# Patient Record
Sex: FEMALE | Race: WHITE | Employment: FULL TIME | URBAN - METROPOLITAN AREA
[De-identification: names, ages, dates, MRNs, and addresses within clinical notes are randomized per-mention and may not be internally consistent; named-entity substitution may affect disease eponyms.]

---

## 2017-02-16 ENCOUNTER — OFFICE VISIT (OUTPATIENT)
Dept: INTERNAL MEDICINE CLINIC | Age: 47
End: 2017-02-16

## 2017-02-16 VITALS
TEMPERATURE: 98.1 F | BODY MASS INDEX: 25.66 KG/M2 | OXYGEN SATURATION: 97 % | HEART RATE: 86 BPM | DIASTOLIC BLOOD PRESSURE: 88 MMHG | WEIGHT: 144.8 LBS | SYSTOLIC BLOOD PRESSURE: 138 MMHG | HEIGHT: 63 IN

## 2017-02-16 DIAGNOSIS — D50.0 IRON DEFICIENCY ANEMIA DUE TO CHRONIC BLOOD LOSS: ICD-10-CM

## 2017-02-16 DIAGNOSIS — N92.1 MENORRHAGIA WITH IRREGULAR CYCLE: ICD-10-CM

## 2017-02-16 DIAGNOSIS — E03.9 HYPOTHYROIDISM, UNSPECIFIED TYPE: Primary | ICD-10-CM

## 2017-02-16 RX ORDER — LIOTHYRONINE SODIUM 25 UG/1
25 TABLET ORAL DAILY
Qty: 90 TAB | Refills: 3 | Status: SHIPPED | OUTPATIENT
Start: 2017-02-16 | End: 2017-12-28

## 2017-02-16 RX ORDER — LEVOTHYROXINE SODIUM 150 MCG
TABLET ORAL
Qty: 90 TAB | Refills: 3 | Status: SHIPPED | OUTPATIENT
Start: 2017-02-16 | End: 2017-04-18 | Stop reason: SDUPTHER

## 2017-02-16 NOTE — MR AVS SNAPSHOT
Visit Information Date & Time Provider Department Dept. Phone Encounter #  
 2/16/2017 10:00 AM Galileo Fang, 2000 [facility] [phone] [id number] Follow-up Instructions Return in about 2 months (around 4/16/2017) for f/u thyroid , anemia , menses. Upcoming Health Maintenance Date Due Pneumococcal 19-64 Medium Risk (1 of 1 - PPSV23) 12/5/1987 DTaP/Tdap/Td series (1 - Tdap) 12/5/1989 PAP AKA CERVICAL CYTOLOGY 12/5/1989 INFLUENZA AGE 9 TO ADULT 8/1/2016 Allergies as of 2/16/2017  Review Complete On: 2/16/2017 By: Galileo Fang MD  
 No Known Allergies Current Immunizations  Never Reviewed No immunizations on file. Not reviewed this visit You Were Diagnosed With   
  
 Codes Comments Hypothyroidism, unspecified type    -  Primary ICD-10-CM: E03.9 ICD-9-CM: 244.9 Iron deficiency anemia due to chronic blood loss     ICD-10-CM: D50.0 ICD-9-CM: 280.0 Menorrhagia with irregular cycle     ICD-10-CM: N92.1 ICD-9-CM: 626.2 Vitals BP Pulse Temp Height(growth percentile) Weight(growth percentile) SpO2  
 138/88 (BP 1 Location: Left arm, BP Patient Position: Sitting) 86 98.1 °F (36.7 °C) (Oral) 5' 3\" (1.6 m) 144 lb 12.8 oz (65.7 kg) 97% BMI OB Status Smoking Status 25.65 kg/m2 Having regular periods Current Every Day Smoker BMI and BSA Data Body Mass Index Body Surface Area  
 25.65 kg/m 2 1.71 m 2 Preferred Pharmacy Pharmacy Name Phone [address] [place] [address], [address] [place] [address] Suzanna Tidwell Said [phone] Your Updated Medication List  
  
   
This list is accurate as of: 2/16/17 11:00 AM.  Always use your most recent med list.  
  
  
  
  
 * levothyroxine 150 mcg tablet Commonly known as:  SYNTHROID Take 1 Tab by mouth Daily (before breakfast). * SYNTHROID 150 mcg tablet Generic drug:  levothyroxine TAKE 1 TABLET BY MOUTH DAILY BEFORE BREAKFAST * LEVOTHYROXINE PO Take 150 mcg by mouth daily. liothyronine 25 mcg tablet Commonly known as:  CYTOMEL Take 1 Tab by mouth daily. PARoxetine 20 mg tablet Commonly known as:  PAXIL Take 1 Tab by mouth daily. * Notice: This list has 3 medication(s) that are the same as other medications prescribed for you. Read the directions carefully, and ask your doctor or other care provider to review them with you. Prescriptions Sent to Pharmacy Refills SYNTHROID 150 mcg tablet 3 Sig: TAKE 1 TABLET BY MOUTH DAILY BEFORE BREAKFAST Class: Normal  
 Pharmacy: [facility] [address] at [address] Ph #: [phone]  
 liothyronine (CYTOMEL) 25 mcg tablet 3 Sig: Take 1 Tab by mouth daily. Class: Normal  
 Pharmacy: [facility] [address] at [address] Ph #: [phone] Route: Oral  
  
We Performed the Following CBC W/O DIFF [83662 CPT(R)] REFERRAL TO OBSTETRICS AND GYNECOLOGY [REF51 Custom] Comments:  
 Please evaluate patient for menstrual irregularity and heavy bleeding T4, FREE [id number] CPT(R)] TSH 3RD GENERATION [69972 CPT(R)] Follow-up Instructions Return in about 2 months (around 4/16/2017) for f/u thyroid , anemia , menses. Referral Information Referral ID Referred By Referred To  
  
 [id number] MALLORIE, [address], MD   
   220 [facility] [address] [place], 310 [facility] Phone: [phone] Fax: [phone] Visits Status Start Date End Date 1 New Request 2/16/17 2/16/18 If your referral has a status of pending review or denied, additional information will be sent to support the outcome of this decision. Introducing [facility]!    
 Curry Baig introduces [employer] patient portal. Now you can access parts of your medical record, email your doctor's office, and request medication refills online. 1. In your internet browser, go to [email or web address] 2. Click on the First Time User? Click Here link in the Sign In box. You will see the New Member Sign Up page. 3. Enter your PowerInbox Access Code exactly as it appears below. You will not need to use this code after youve completed the sign-up process. If you do not sign up before the expiration date, you must request a new code. · PowerInbox Access Code: [email or web address] Expires: 5/17/2017 10:55 AM 
 
4. Enter the last four digits of your Social Security Number (xxxx) and Date of Birth (mm/dd/yyyy) as indicated and click Submit. You will be taken to the next sign-up page. 5. Create a PowerInbox ID. This will be your PowerInbox login ID and cannot be changed, so think of one that is secure and easy to remember. 6. Create a PowerInbox password. You can change your password at any time. 7. Enter your Password Reset Question and Answer. This can be used at a later time if you forget your password. 8. Enter your e-mail address. You will receive e-mail notification when new information is available in [address] 9. Click Sign Up. You can now view and download portions of your medical record. 10. Click the Download Summary menu link to download a portable copy of your medical information. If you have questions, please visit the Frequently Asked Questions section of the PowerInbox website. Remember, PowerInbox is NOT to be used for urgent needs. For medical emergencies, dial 911. Now available from your iPhone and Android! Please provide this summary of care documentation to your next provider. Your primary care clinician is listed as Douglas NOBLES. If you have any questions after today's visit, please call [phone].

## 2017-02-16 NOTE — PROGRESS NOTES
HISTORY OF Cj Gomes is a 48 y.o. female. HPI   Here to reestablish care. Moved from  [place], [place] Dr Dani Adler  Hx hypothyroidism --takes synthorid and cytomel. Former pt of Dr Porter Leos  Has been out of thyroid med x 3 months, feels cold, fatigeud, constipation and diarrhea    Required blood transfusion about 1 yr ago for anemia  Had colonoscopy-no polyps per pt  Has heavy menses with clots 2 times per month x 1-2 years  No gyn evaluation per pt      Patient Active Problem List    Diagnosis Date Noted    Iron deficiency anemia due to chronic blood loss 02/16/2017    Unspecified hypothyroidism 02/11/2013    Smoker 02/11/2013    Depression 02/11/2013    Anxiety 02/11/2013    AK (actinic keratosis) 02/11/2013     Current Outpatient Prescriptions   Medication Sig Dispense Refill    SYNTHROID 150 mcg tablet TAKE 1 TABLET BY MOUTH DAILY BEFORE BREAKFAST 90 Tab 3    liothyronine (CYTOMEL) 25 mcg tablet Take 1 Tab by mouth daily. 90 Tab 3    levothyroxine (SYNTHROID) 150 mcg tablet Take 1 Tab by mouth Daily (before breakfast). 30 Tab 6    PARoxetine (PAXIL) 20 mg tablet Take 1 Tab by mouth daily. 90 Tab 1    LEVOTHYROXINE SODIUM (LEVOTHYROXINE PO) Take 150 mcg by mouth daily. No Known Allergies  Past Medical History   Diagnosis Date    Endocrine disease     Other ill-defined conditions(799.89)      graves     No past surgical history on file.   Family History   Problem Relation Age of Onset    Cancer Mother      thyroid cancer and depression    Psychiatric Disorder Mother     Diabetes Brother     Heart Disease Brother      Social History   Substance Use Topics    Smoking status: Current Every Day Smoker     Packs/day: 1.00     Years: 30.00    Smokeless tobacco: Never Used    Alcohol use Yes      Comment: 10      Lab Results  Component Value Date/Time   WBC 8.8 08/04/2012 11:45 PM   HGB 9.3 08/04/2012 11:45 PM   HCT 30.4 08/04/2012 11:45 PM   PLATELET 338 08/04/2012 11:45 PM   MCV 77.4 08/04/2012 11:45 PM       Lab Results  Component Value Date/Time   GFR est AA >60 08/04/2012 11:45 PM   GFR est non-AA >60 08/04/2012 11:45 PM   Creatinine 0.7 08/04/2012 11:45 PM   BUN 5 08/04/2012 11:45 PM   Sodium 146 08/04/2012 11:45 PM   Potassium 3.9 08/04/2012 11:45 PM   Chloride 111 08/04/2012 11:45 PM   CO2 29 08/04/2012 11:45 PM      No results found for: TSH, TSH2, TSH3, TSHP, TSHELE, TSHEXT, TT3, T3U, T3UP, FRT3, FT3, FT4, FT4P, T4, T4P, FT4T, TT7, TSHEXT      Review of Systems   Constitutional: Positive for malaise/fatigue. Negative for chills, fever and weight loss. HENT: Negative for congestion, ear discharge, hearing loss, nosebleeds, sore throat and tinnitus. Eyes: Negative for blurred vision, double vision, photophobia, pain, discharge and redness. Respiratory: Positive for cough and shortness of breath. Negative for hemoptysis, sputum production, wheezing and stridor. Cardiovascular: Positive for palpitations. Negative for chest pain, orthopnea, claudication, leg swelling and PND. Gastrointestinal: Negative for abdominal pain, blood in stool, constipation, diarrhea, heartburn, melena and nausea. Genitourinary: Negative for dysuria. Skin: Negative for rash. Physical Exam   Constitutional: She is oriented to person, place, and time. She appears well-developed and well-nourished. Appears stated age   HENT:   Head: Normocephalic. Mouth/Throat: Oropharynx is clear and moist.   Eyes: Pupils are equal, round, and reactive to light. Left eye exhibits no discharge. Neck: Normal range of motion. Neck supple. No JVD present. No tracheal deviation present. No thyromegaly present. Cardiovascular: Normal rate, regular rhythm, normal heart sounds and intact distal pulses. Exam reveals no gallop and no friction rub. No murmur heard. Pulmonary/Chest: Effort normal and breath sounds normal. No respiratory distress. She has no wheezes.  She has no rales. She exhibits no tenderness. Abdominal: Soft. Bowel sounds are normal. She exhibits no distension and no mass. There is no tenderness. There is no rebound and no guarding. Musculoskeletal: She exhibits no edema. Lymphadenopathy:     She has no cervical adenopathy. Neurological: She is alert and oriented to person, place, and time. No cranial nerve deficit. Coordination normal.   Skin: Skin is warm. tattoos on arms/wrists b/l   Psychiatric: She has a normal mood and affect. Her behavior is normal. Judgment and thought content normal.   Nursing note and vitals reviewed. ASSESSMENT and [address] was seen today for establish care. Diagnoses and all orders for this visit:    Hypothyroidism, unspecified type  -     TSH 3RD GENERATION  -     T4, FREE   Refill and restart cytomel and synthroid--BRAND   Plan on repeat labs in 8 weeks    Iron deficiency anemia due to chronic blood loss  -     CBC W/O DIFF  -     REFERRAL TO OBSTETRICS AND GYNECOLOGY--   May need oral iron and or transfusion    S/p colonoscopy< 2 years ago per pt   Symptomatic from low thyroid and anemia      Menorrhagia with irregular cycle  -     REFERRAL TO OBSTETRICS AND GYNECOLOGY  -     REFERRAL TO OBSTETRICS AND GYNECOLOGY    Other orders  -     SYNTHROID 150 mcg tablet; TAKE 1 TABLET BY MOUTH DAILY BEFORE BREAKFAST  -     liothyronine (CYTOMEL) 25 mcg tablet; Take 1 Tab by mouth daily. Sign release of records  Pt brought labs from 2014 reviewed and scanned  Follow-up Disposition:  Return in about 2 months (around 4/16/2017) for f/u thyroid , anemia , menses.

## 2017-02-17 LAB
ERYTHROCYTE [DISTWIDTH] IN BLOOD BY AUTOMATED COUNT: 19.3 % (ref 12.3–15.4)
HCT VFR BLD AUTO: 28.5 % (ref 34–46.6)
HGB BLD-MCNC: 7.9 G/DL (ref 11.1–15.9)
MCH RBC QN AUTO: 18.6 PG (ref 26.6–33)
MCHC RBC AUTO-ENTMCNC: 27.7 G/DL (ref 31.5–35.7)
MCV RBC AUTO: 67 FL (ref 79–97)
PLATELET # BLD AUTO: 377 X10E3/UL (ref 150–379)
RBC # BLD AUTO: 4.25 X10E6/UL (ref 3.77–5.28)
T4 FREE SERPL-MCNC: <0.11 NG/DL (ref 0.82–1.77)
TSH SERPL DL<=0.005 MIU/L-ACNC: 190.5 UIU/ML (ref 0.45–4.5)
WBC # BLD AUTO: 8.9 X10E3/UL (ref 3.4–10.8)

## 2017-02-22 NOTE — PROGRESS NOTES
Discussed labs. Has restarted levothyroxine. Advised to start otc iron 325 mg tid and needs to see gyn MD in next 2-3 weeks for significant anemia.  . Pt verbalized understanding and agreement

## 2017-04-18 ENCOUNTER — OFFICE VISIT (OUTPATIENT)
Dept: INTERNAL MEDICINE CLINIC | Age: 47
End: 2017-04-18

## 2017-04-18 VITALS
WEIGHT: 135 LBS | BODY MASS INDEX: 23.91 KG/M2 | DIASTOLIC BLOOD PRESSURE: 78 MMHG | TEMPERATURE: 97.7 F | HEART RATE: 89 BPM | SYSTOLIC BLOOD PRESSURE: 130 MMHG | OXYGEN SATURATION: 100 %

## 2017-04-18 DIAGNOSIS — F32.A DEPRESSION, UNSPECIFIED DEPRESSION TYPE: ICD-10-CM

## 2017-04-18 DIAGNOSIS — D50.9 IRON DEFICIENCY ANEMIA, UNSPECIFIED IRON DEFICIENCY ANEMIA TYPE: ICD-10-CM

## 2017-04-18 DIAGNOSIS — E03.9 UNSPECIFIED HYPOTHYROIDISM: Primary | ICD-10-CM

## 2017-04-18 RX ORDER — ESCITALOPRAM OXALATE 10 MG/1
10 TABLET ORAL DAILY
Qty: 30 TAB | Refills: 3 | Status: SHIPPED | OUTPATIENT
Start: 2017-04-18 | End: 2017-06-20 | Stop reason: SDUPTHER

## 2017-04-18 NOTE — PROGRESS NOTES
HISTORY OF Cj Gomes is a 48 y.o. female. HPI   F/u anemia/menorrhagia, hypothyroidism  Feeling depressed for months daily, took paxil in [place] last year--didn't feel well on it the 2nd time  Motivation is lacking, bad thoughts and mood. Has 7-9 days of heavy menses per month. Did not see gyn MD yet and has not seen gyn MD while in [place] previously  Weight is down several lbs since restarting levothyroxine but still feels cold  Not much appetite  Smokes 1/2 ppd  etoh 3-4 drinks per day. Patient Active Problem List    Diagnosis Date Noted    Iron deficiency anemia due to chronic blood loss 02/16/2017    Unspecified hypothyroidism 02/11/2013    Smoker 02/11/2013    Depression 02/11/2013    Anxiety 02/11/2013    AK (actinic keratosis) 02/11/2013     Current Outpatient Prescriptions   Medication Sig Dispense Refill    escitalopram oxalate (LEXAPRO) 10 mg tablet Take 1 Tab by mouth daily. 30 Tab 3    levothyroxine (SYNTHROID) 150 mcg tablet Take 1 Tab by mouth Daily (before breakfast). 30 Tab 6    liothyronine (CYTOMEL) 25 mcg tablet Take 1 Tab by mouth daily. 90 Tab 3     No Known Allergies   Lab Results  Component Value Date/Time   Glucose 93 08/04/2012 11:45 PM   Creatinine 0.7 08/04/2012 11:45 PM      No results found for: CHOL, CHOLX, CHLST, CHOLV, HDL, LDL, DLDL, LDLC, DLDLP, TGL, TGLX, TRIGL, [id number], TRIGP, CHHD, CHHDX    Lab Results  Component Value Date/Time   GFR est AA >60 08/04/2012 11:45 PM   GFR est non-AA >60 08/04/2012 11:45 PM   Creatinine 0.7 08/04/2012 11:45 PM   BUN 5 08/04/2012 11:45 PM   Sodium 146 08/04/2012 11:45 PM   Potassium 3.9 08/04/2012 11:45 PM   Chloride 111 08/04/2012 11:45 PM   CO2 29 08/04/2012 11:45 PM         ROS    Physical Exam   Constitutional: She appears well-developed and well-nourished. Appears stated age   Cardiovascular: Normal rate, regular rhythm and normal heart sounds. Exam reveals no gallop and no friction rub.     No murmur heard. Pulmonary/Chest: Effort normal and breath sounds normal. No respiratory distress. She has no wheezes. Abdominal: Soft. Bowel sounds are normal.   Musculoskeletal: She exhibits no edema. Neurological: She is alert. Psychiatric: She has a normal mood and affect. Nursing note and vitals reviewed. ASSESSMENT and [address] was seen today for thyroid problem, anemia and irregular menses. Diagnoses and all orders for this visit:    Unspecified hypothyroidism  -     TSH 3RD GENERATION  -     T4, FREE   Pt reports med adherence    Iron deficiency anemia, unspecified iron deficiency anemia type  -     CBC W/O DIFF  -     IRON PROFILE  -     FERRITIN  -     REFERRAL TO OBSTETRICS AND GYNECOLOGY-staff with assist with appt   Unable to tolerate oral iron d/t gi sxs per pt    Depression, unspecified depression type   Start lexapro 10 mg qd  Other orders  -     escitalopram oxalate (LEXAPRO) 10 mg tablet; Take 1 Tab by mouth daily. Follow-up Disposition:  Return in about 2 months (around 6/18/2017) for depression anemia.

## 2017-04-18 NOTE — MR AVS SNAPSHOT
Visit Information Date & Time Provider Department Dept. Phone Encounter #  
 4/18/2017 11:15 AM Brittany Ramirez, [address] [facility] [address] [phone] [id number] Follow-up Instructions Return in about 2 months (around 6/18/2017) for depression anemia. Upcoming Health Maintenance Date Due Pneumococcal 19-64 Medium Risk (1 of 1 - PPSV23) 12/5/1987 DTaP/Tdap/Td series (1 - Tdap) 12/5/1989 PAP AKA CERVICAL CYTOLOGY 12/5/1989 INFLUENZA AGE 9 TO ADULT 8/1/2016 Allergies as of 4/18/2017  Review Complete On: 4/18/2017 By: Brittany Ramirez MD  
 No Known Allergies Current Immunizations  Never Reviewed No immunizations on file. Not reviewed this visit You Were Diagnosed With   
  
 Codes Comments Unspecified hypothyroidism    -  Primary ICD-10-CM: E03.9 ICD-9-CM: 244.9 Iron deficiency anemia, unspecified iron deficiency anemia type     ICD-10-CM: D50.9 ICD-9-CM: 280.9 Depression, unspecified depression type     ICD-10-CM: F32.9 ICD-9-CM: 897 Vitals BP Pulse Temp Weight(growth percentile) LMP SpO2  
 130/78 (BP 1 Location: Left arm, BP Patient Position: Sitting) 89 97.7 °F (36.5 °C) (Oral) 135 lb (61.2 kg) 04/04/2017 100% BMI OB Status Smoking Status 23.91 kg/m2 Unknown Current Every Day Smoker Vitals History BMI and BSA Data Body Mass Index Body Surface Area  
 23.91 kg/m 2 1.65 m 2 Preferred Pharmacy Pharmacy Name Phone [id number] [address] [phone] Your Updated Medication List  
  
   
This list is accurate as of: 4/18/17 11:40 AM.  Always use your most recent med list.  
  
  
  
  
 escitalopram oxalate 10 mg tablet Commonly known as:  Aleatha Bernheim Take 1 Tab by mouth daily. levothyroxine 150 mcg tablet Commonly known as:  SYNTHROID Take 1 Tab by mouth Daily (before breakfast). liothyronine 25 mcg tablet Commonly known as:  CYTOMEL Take 1 Tab by mouth daily. Prescriptions Sent to Pharmacy Refills  
 escitalopram oxalate (LEXAPRO) 10 mg tablet 3 Sig: Take 1 Tab by mouth daily. Class: Normal  
 Pharmacy: [id number]- [address]  #: [phone] Route: Oral  
  
We Performed the Following CBC W/O DIFF [84126 CPT(R)] FERRITIN [98398 CPT(R)] IRON PROFILE [id number] CPT(R)] REFERRAL TO OBSTETRICS AND GYNECOLOGY [REF51 Custom] Comments:  
 Please evaluate patient for anemia, menorrhaiga T4, FREE [id number] CPT(R)] TSH 3RD GENERATION [67199 CPT(R)] Follow-up Instructions Return in about 2 months (around 6/18/2017) for depression anemia. Referral Information Referral ID Referred By Referred To  
  
 [id number] MALLORIE, [address] [place], [address] Visits Status Start Date End Date 1 New Request 4/18/17 4/18/18 If your referral has a status of pending review or denied, additional information will be sent to support the outcome of this decision. Introducing [facility]! Ce Pedroza introduces [employer] patient portal. Now you can access parts of your medical record, email your doctor's office, and request medication refills online. 1. In your internet browser, go to [email or web address] Who is [email or web address] 2. Click on the First Time User? Click Here link in the Sign In box. You will see the New Member Sign Up page. 3. Enter your [employer] Access Code exactly as it appears below. You will not need to use this code after youve completed the sign-up process. If you do not sign up before the expiration date, you must request a new code. · [employer] Access Code: [email or web address] Expires: 5/17/2017 11:55 AM 
 
 4. Enter the last four digits of your Social Security Number (xxxx) and Date of Birth (mm/dd/yyyy) as indicated and click Submit. You will be taken to the next sign-up page. 5. Create a Medify ID. This will be your Medify login ID and cannot be changed, so think of one that is secure and easy to remember. 6. Create a Medify password. You can change your password at any time. 7. Enter your Password Reset Question and Answer. This can be used at a later time if you forget your password. 8. Enter your e-mail address. You will receive e-mail notification when new information is available in [address] 9. Click Sign Up. You can now view and download portions of your medical record. 10. Click the Download Summary menu link to download a portable copy of your medical information. If you have questions, please visit the Frequently Asked Questions section of the Medify website. Remember, Medify is NOT to be used for urgent needs. For medical emergencies, dial 911. Now available from your iPhone and Android! Please provide this summary of care documentation to your next provider. Your primary care clinician is listed as Luzma NOBLES. If you have any questions after today's visit, please call [phone].

## 2017-04-19 LAB
ERYTHROCYTE [DISTWIDTH] IN BLOOD BY AUTOMATED COUNT: 18.8 % (ref 12.3–15.4)
FERRITIN SERPL-MCNC: 4 NG/ML (ref 15–150)
HCT VFR BLD AUTO: 25.3 % (ref 34–46.6)
HGB BLD-MCNC: 6.9 G/DL (ref 11.1–15.9)
IRON SATN MFR SERPL: 3 % (ref 15–55)
IRON SERPL-MCNC: 12 UG/DL (ref 27–159)
MCH RBC QN AUTO: 18 PG (ref 26.6–33)
MCHC RBC AUTO-ENTMCNC: 27.3 G/DL (ref 31.5–35.7)
MCV RBC AUTO: 66 FL (ref 79–97)
PLATELET # BLD AUTO: 313 X10E3/UL (ref 150–379)
RBC # BLD AUTO: 3.84 X10E6/UL (ref 3.77–5.28)
T4 FREE SERPL-MCNC: 1.06 NG/DL (ref 0.82–1.77)
TIBC SERPL-MCNC: 466 UG/DL (ref 250–450)
TSH SERPL DL<=0.005 MIU/L-ACNC: 0.04 UIU/ML (ref 0.45–4.5)
UIBC SERPL-MCNC: 454 UG/DL (ref 131–425)
WBC # BLD AUTO: 8.2 X10E3/UL (ref 3.4–10.8)

## 2017-04-21 RX ORDER — LEVOTHYROXINE SODIUM 125 UG/1
125 TABLET ORAL
Qty: 30 TAB | Refills: 6 | Status: SHIPPED | OUTPATIENT
Start: 2017-04-21 | End: 2017-05-26

## 2017-04-21 NOTE — PROGRESS NOTES
Discussed labs with pt--she is tired but not dizzy and does not want to go to the hospital.  We will arrange for outp blood transfusion for next week when she can get off work .   She will see gynMD next week--Dr Sergey Calhoun  Lower dose of levothyroxine in 125 mcg qd

## 2017-04-24 ENCOUNTER — DOCUMENTATION ONLY (OUTPATIENT)
Dept: INTERNAL MEDICINE CLINIC | Age: 47
End: 2017-04-24

## 2017-04-24 ENCOUNTER — TELEPHONE (OUTPATIENT)
Dept: INTERNAL MEDICINE CLINIC | Age: 47
End: 2017-04-24

## 2017-04-24 NOTE — TELEPHONE ENCOUNTER
Documentation of Dr. Mariama Moura speaking with patient regarding labs. No other call made to patient.

## 2017-04-24 NOTE — PROGRESS NOTES
Called pt --she has not heard from the infusion center. She can be contacted on her cell phone tomorrow. Area code is wring in chart.   Her number is [phone]

## 2017-04-24 NOTE — TELEPHONE ENCOUNTER
Patient is returning a call. Her number is [phone].     Message received & copied from [facility] after closing on 4/21/17

## 2017-04-25 ENCOUNTER — TELEPHONE (OUTPATIENT)
Dept: INTERNAL MEDICINE CLINIC | Age: 47
End: 2017-04-25

## 2017-04-25 ENCOUNTER — HOSPITAL ENCOUNTER (OUTPATIENT)
Dept: INFUSION THERAPY | Age: 47
Discharge: HOME | End: 2017-04-25
Payer: COMMERCIAL

## 2017-04-25 VITALS
TEMPERATURE: 99.1 F | DIASTOLIC BLOOD PRESSURE: 82 MMHG | HEART RATE: 80 BPM | RESPIRATION RATE: 16 BRPM | SYSTOLIC BLOOD PRESSURE: 145 MMHG

## 2017-04-25 PROCEDURE — 36415 COLL VENOUS BLD VENIPUNCTURE: CPT | Performed by: INTERNAL MEDICINE

## 2017-04-25 PROCEDURE — 86920 COMPATIBILITY TEST SPIN: CPT | Performed by: INTERNAL MEDICINE

## 2017-04-25 PROCEDURE — 86900 BLOOD TYPING SEROLOGIC ABO: CPT | Performed by: INTERNAL MEDICINE

## 2017-04-25 NOTE — TELEPHONE ENCOUNTER
Pt states that she is needing a call back in regards to getting a consent form for her infusion that she is having on 4/27/17. Please call pt.

## 2017-04-25 NOTE — PROGRESS NOTES
I spoke with patient and attempted to schedule her appt for transfusion. The Infusion Center needs patient to come in for a type & cross before this appt can be scheduled.  Patient provided with their phone number to call & set this up first.

## 2017-04-25 NOTE — PROGRESS NOTES
[facility] ([place], [place]) Lab Draw Note:  Arrived - 5419    Visit Vitals    /82 (BP 1 Location: Left arm, BP Patient Position: Sitting)    Pulse 80    Temp 99.1 °F (37.3 °C)    Resp 16    LMP 04/04/2017       Labs drawn peripherally from L and R AC arms - T&C (2pink tubes). Different times drawn for new transfusion. Pt left at 1625 - Tolerated well. Reviewed pre-transfusion instructions - take all morning meds, bring meds needed during the day with them, & bring a lunch. Verbalized understanding. Pt denies any acute problems/changes. Discharged from [facility] ambulatory. No distress.

## 2017-04-26 RX ORDER — DIPHENHYDRAMINE HCL 25 MG
25 CAPSULE ORAL ONCE
Status: COMPLETED | OUTPATIENT
Start: 2017-04-27 | End: 2017-04-27

## 2017-04-26 RX ORDER — ACETAMINOPHEN 325 MG/1
650 TABLET ORAL ONCE
Status: COMPLETED | OUTPATIENT
Start: 2017-04-27 | End: 2017-04-27

## 2017-04-27 ENCOUNTER — HOSPITAL ENCOUNTER (OUTPATIENT)
Dept: INFUSION THERAPY | Age: 47
Discharge: HOME | End: 2017-04-27
Payer: COMMERCIAL

## 2017-04-27 VITALS
TEMPERATURE: 97.4 F | SYSTOLIC BLOOD PRESSURE: 144 MMHG | DIASTOLIC BLOOD PRESSURE: 85 MMHG | HEART RATE: 75 BPM | RESPIRATION RATE: 18 BRPM

## 2017-04-27 PROCEDURE — 77030013169 SET IV BLD ICUM -A

## 2017-04-27 PROCEDURE — 74011250637 HC RX REV CODE- 250/637: Performed by: INTERNAL MEDICINE

## 2017-04-27 PROCEDURE — 74011250636 HC RX REV CODE- 250/636: Performed by: INTERNAL MEDICINE

## 2017-04-27 PROCEDURE — 36430 TRANSFUSION BLD/BLD COMPNT: CPT

## 2017-04-27 PROCEDURE — P9016 RBC LEUKOCYTES REDUCED: HCPCS | Performed by: INTERNAL MEDICINE

## 2017-04-27 RX ORDER — SODIUM CHLORIDE 0.9 % (FLUSH) 0.9 %
10-40 SYRINGE (ML) INJECTION AS NEEDED
Status: ACTIVE | OUTPATIENT
Start: 2017-04-27 | End: 2017-04-28

## 2017-04-27 RX ORDER — SODIUM CHLORIDE 9 MG/ML
250 INJECTION, SOLUTION INTRAVENOUS AS NEEDED
Status: DISCONTINUED | OUTPATIENT
Start: 2017-04-27 | End: 2017-05-01 | Stop reason: HOSPADM

## 2017-04-27 RX ADMIN — DIPHENHYDRAMINE HYDROCHLORIDE 25 MG: 25 CAPSULE ORAL at 08:16

## 2017-04-27 RX ADMIN — ACETAMINOPHEN 650 MG: 325 TABLET ORAL at 08:16

## 2017-04-27 RX ADMIN — Medication 10 ML: at 08:20

## 2017-04-27 RX ADMIN — SODIUM CHLORIDE 250 ML: 900 INJECTION, SOLUTION INTRAVENOUS at 08:50

## 2017-04-27 RX ADMIN — Medication 10 ML: at 14:13

## 2017-04-27 NOTE — PROGRESS NOTES
0810   Pt arrived at [address] [place] [address] and in no distress for transfusion of 2 units PRBCs. Patient Vitals for the past 12 hrs:   Temp Pulse Resp BP   04/27/17 1412 97.4 °F (36.3 °C) 75 18 144/85   04/27/17 1340 97.4 °F (36.3 °C) 76 16 132/82   04/27/17 1240 97.9 °F (36.6 °C) 78 18 118/74   04/27/17 1210 98.5 °F (36.9 °C) 76 18 128/83   04/27/17 1155 98.7 °F (37.1 °C) 84 18 125/78   04/27/17 1128 98.5 °F (36.9 °C) 79 18 118/72   04/27/17 1050 98 °F (36.7 °C) 79 18 118/74   04/27/17 0950 98.7 °F (37.1 °C) 85 20 129/82   04/27/17 0920 98.6 °F (37 °C) 81 18 120/69   04/27/17 0905 98.2 °F (36.8 °C) 73 18 131/81   04/27/17 0847 98.1 °F (36.7 °C) 71 18 132/83   04/27/17 0812 98.4 °F (36.9 °C) 85 18 134/82     Assessment completed, no new complaints voiced. IV established in L wrist without difficulty. Signs/symptoms of adverse blood reaction discussed with pt, voiced understanding. Medications received:   NS @ KVO   Tylenol 650 mg po   Benadryl 25 mg po     0850: 1st unit PRBCs started and infusing without difficulty, will monitor. 1054: 1st unit completed without adverse reaction noted, NS flushing line. 1140: 2nd unit PRBCs started and infusing without difficulty   1346: 2nd unit completed without adverse reaction noted, NS flushing line. 1415-- Tolerated transfusion well, no adverse reaction noted. D/C instructions reviewed, copy to pt, voiced understanding. Patient declined 1 hour post transfusion observation. IV flushed, and removed. Site wrapped with 2x2 and coban. D/Cd from [address] [place] [address] and in no distress accompanied by spouse. No further appointments scheduled.

## 2017-04-28 LAB
ABO + RH BLD: NORMAL
BLD PROD TYP BPU: NORMAL
BLD PROD TYP BPU: NORMAL
BLOOD GROUP ANTIBODIES SERPL: NORMAL
BPU ID: NORMAL
BPU ID: NORMAL
CROSSMATCH RESULT,%XM: NORMAL
CROSSMATCH RESULT,%XM: NORMAL
SPECIMEN EXP DATE BLD: NORMAL
STATUS OF UNIT,%ST: NORMAL
STATUS OF UNIT,%ST: NORMAL
UNIT DIVISION, %UDIV: 0
UNIT DIVISION, %UDIV: 0

## 2017-05-15 RX ORDER — IBUPROFEN 200 MG
400 TABLET ORAL
COMMUNITY

## 2017-05-15 NOTE — PERIOP NOTES
Spoke with Marta Olivas, nurse for Dr. Isadora Charles. Notified that last hgb in [facility] care was 6.9 on 4/18/17, and pt received 2 units PRBC 4/27/17. She will discuss with Dr. Isadora Charles and fax updated orders.

## 2017-05-15 NOTE — PERIOP NOTES
[facility]  Ambulatory Surgery Unit  Pre-operative Instructions    Surgery/Procedure Date  5/18            Tentative Arrival Time 3549      1. On the day of your surgery/procedure, please report to the Ambulatory Surgery Unit Registration Desk and sign in at your designated time. The Ambulatory Surgery Unit is located in [facility] on the Critical access hospital side of the [facility] across from the [address] [place] [address]. Please have all of your health insurance cards and a photo ID. 2. You must have someone with you to drive you home, as you should not drive a car for 24 hours following anesthesia. Please make arrangements for a responsible adult friend or family member to stay with you for at least the first 24 hours after your surgery. 3. Do not have anything to eat or drink (including water, gum, mints, coffee, juice) after midnight 5/17. This may not apply to medications prescribed by your physician. (Please note below the special instructions with medications to take the morning of surgery, if applicable.)    4. We recommend you do not drink any alcoholic beverages for 24 hours before and after your surgery. 5. Stop all Aspirin, non-steroidal anti-inflammatory drugs (i.e. Advil, Aleve), vitamins, and supplements as directed by your surgeon's office. **If you are currently taking Plavix, Coumadin, or other blood-thinning agents, contact your surgeon for instructions. **    6. In an effort to help prevent surgical site infection, we ask that you shower with an anti-bacterial soap (i.e. Dial or Safeguard) for 3 days prior to and on the morning of surgery, using a fresh towel after each shower. (Please begin this process with fresh bed linens.) Do not apply any lotions, powders, or deodorants after the shower on the day of your procedure. If applicable, please do not shave the operative site for 48 hours prior to surgery. 7. Wear comfortable clothes. Wear glasses instead of contacts.  Do not bring any jewelry or money (other than copays or fees as instructed). Do not wear make-up, particularly mascara, the morning of your surgery. Do not wear nail polish, particularly if you are having foot /hand surgery. Wear your hair loose or down, no ponytails, buns, wanda pins or clips. All body piercings must be removed. 8. You should understand that if you do not follow these instructions your surgery may be cancelled. If your physical condition changes (i.e. fever, cold or flu) please contact your surgeon as soon as possible. 9. It is important that you be on time. If a situation occurs where you may be late, or if you have any questions or problems, please call [phone].    10. Your surgery time may be subject to change. You will receive a phone call the day prior to surgery to confirm your arrival time. Special Instructions: Take all medications and inhalers, as prescribed, on the morning of surgery with a sip of water EXCEPT: none      I understand a pre-operative phone call will be made to verify my surgery time. In the event that I am not available, I give permission for a message to be left on my answering service and/or with another person?       yes         ___________________      ___________________      ________________  (Signature of Patient)          (Witness)                   (Date and Time)

## 2017-05-16 ENCOUNTER — HOSPITAL ENCOUNTER (OUTPATIENT)
Dept: SURGERY | Age: 47
Setting detail: OUTPATIENT SURGERY
Discharge: HOME | End: 2017-05-16
Payer: COMMERCIAL

## 2017-05-16 VITALS
DIASTOLIC BLOOD PRESSURE: 86 MMHG | HEART RATE: 110 BPM | SYSTOLIC BLOOD PRESSURE: 145 MMHG | RESPIRATION RATE: 18 BRPM | TEMPERATURE: 99 F | OXYGEN SATURATION: 97 %

## 2017-05-16 LAB
ABO + RH BLD: NORMAL
BLOOD GROUP ANTIBODIES SERPL: NORMAL
ERYTHROCYTE [DISTWIDTH] IN BLOOD BY AUTOMATED COUNT: 25.8 % (ref 11.5–14.5)
HCT VFR BLD AUTO: 30.4 % (ref 35–47)
HGB BLD-MCNC: 9.1 G/DL (ref 11.5–16)
MCH RBC QN AUTO: 20.9 PG (ref 26–34)
MCHC RBC AUTO-ENTMCNC: 29.9 G/DL (ref 30–36.5)
MCV RBC AUTO: 69.9 FL (ref 80–99)
PLATELET # BLD AUTO: 284 K/UL (ref 150–400)
RBC # BLD AUTO: 4.35 M/UL (ref 3.8–5.2)
SPECIMEN EXP DATE BLD: NORMAL
WBC # BLD AUTO: 8.7 K/UL (ref 3.6–11)

## 2017-05-16 PROCEDURE — 85027 COMPLETE CBC AUTOMATED: CPT | Performed by: ANESTHESIOLOGY

## 2017-05-16 PROCEDURE — 36415 COLL VENOUS BLD VENIPUNCTURE: CPT | Performed by: ANESTHESIOLOGY

## 2017-05-16 PROCEDURE — 86850 RBC ANTIBODY SCREEN: CPT | Performed by: ANESTHESIOLOGY

## 2017-05-16 NOTE — PERIOP NOTES
Per Dr. Michael Plaza, see if patient is able to come for PAT appointment for CBC today. PAT scheduled. 1600 Dr. Michael Plaza notified Hgb 9.1, no new orders. [address] call to Marilyn Manrique, nurse for Dr. Gerhardt Bolt. Notified that anesthesia wanted pt to come for PAT visit for labs. Notified that Hgb 9.1, CBC is resulted in cc. Crystal will notify Dr. Gerhardt Bolt.

## 2017-05-17 ENCOUNTER — ANESTHESIA EVENT (OUTPATIENT)
Dept: SURGERY | Age: 47
End: 2017-05-17
Payer: COMMERCIAL

## 2017-05-18 ENCOUNTER — HOSPITAL ENCOUNTER (OUTPATIENT)
Age: 47
Setting detail: OUTPATIENT SURGERY
Discharge: HOME | End: 2017-05-18
Attending: SPECIALIST | Admitting: SPECIALIST
Payer: COMMERCIAL

## 2017-05-18 ENCOUNTER — ANESTHESIA (OUTPATIENT)
Dept: SURGERY | Age: 47
End: 2017-05-18
Payer: COMMERCIAL

## 2017-05-18 VITALS
DIASTOLIC BLOOD PRESSURE: 80 MMHG | HEIGHT: 62 IN | RESPIRATION RATE: 11 BRPM | TEMPERATURE: 98.5 F | HEART RATE: 69 BPM | OXYGEN SATURATION: 98 % | BODY MASS INDEX: 23.95 KG/M2 | SYSTOLIC BLOOD PRESSURE: 156 MMHG | WEIGHT: 130.13 LBS

## 2017-05-18 LAB
DAILY QC (YES/NO)?: YES
HCG UR QL: NEGATIVE
HGB BLD-MCNC: 9.1 G/DL (ref 11.5–16)

## 2017-05-18 PROCEDURE — 77030018836 HC SOL IRR NACL ICUM -A: Performed by: SPECIALIST

## 2017-05-18 PROCEDURE — 81025 URINE PREGNANCY TEST: CPT

## 2017-05-18 PROCEDURE — 76210000046 HC AMBSU PH II REC FIRST 0.5 HR: Performed by: SPECIALIST

## 2017-05-18 PROCEDURE — 76060000073 HC AMB SURG ANES FIRST 0.5 HR: Performed by: SPECIALIST

## 2017-05-18 PROCEDURE — 76210000040 HC AMBSU PH I REC FIRST 0.5 HR: Performed by: SPECIALIST

## 2017-05-18 PROCEDURE — 76030000002 HC AMB SURG OR TIME FIRST 0.: Performed by: SPECIALIST

## 2017-05-18 PROCEDURE — 88305 TISSUE EXAM BY PATHOLOGIST: CPT | Performed by: SPECIALIST

## 2017-05-18 PROCEDURE — 77030003666 HC NDL SPINAL BD -A: Performed by: SPECIALIST

## 2017-05-18 PROCEDURE — 85018 HEMOGLOBIN: CPT | Performed by: SPECIALIST

## 2017-05-18 PROCEDURE — 74011250636 HC RX REV CODE- 250/636: Performed by: ANESTHESIOLOGY

## 2017-05-18 PROCEDURE — 74011000250 HC RX REV CODE- 250: Performed by: SPECIALIST

## 2017-05-18 PROCEDURE — 74011250636 HC RX REV CODE- 250/636

## 2017-05-18 PROCEDURE — 77030021163 HC TUBE CYSTO IRR ICUM -A: Performed by: SPECIALIST

## 2017-05-18 RX ORDER — SODIUM CHLORIDE 0.9 % (FLUSH) 0.9 %
5-10 SYRINGE (ML) INJECTION EVERY 8 HOURS
Status: DISCONTINUED | OUTPATIENT
Start: 2017-05-18 | End: 2017-05-18 | Stop reason: HOSPADM

## 2017-05-18 RX ORDER — OXYCODONE AND ACETAMINOPHEN 5; 325 MG/1; MG/1
1 TABLET ORAL ONCE
Status: DISCONTINUED | OUTPATIENT
Start: 2017-05-18 | End: 2017-05-18 | Stop reason: HOSPADM

## 2017-05-18 RX ORDER — SODIUM CHLORIDE, SODIUM LACTATE, POTASSIUM CHLORIDE, CALCIUM CHLORIDE 600; 310; 30; 20 MG/100ML; MG/100ML; MG/100ML; MG/100ML
25 INJECTION, SOLUTION INTRAVENOUS CONTINUOUS
Status: DISCONTINUED | OUTPATIENT
Start: 2017-05-18 | End: 2017-05-18 | Stop reason: HOSPADM

## 2017-05-18 RX ORDER — OXYCODONE AND ACETAMINOPHEN 5; 325 MG/1; MG/1
1 TABLET ORAL
Qty: 15 TAB | Refills: 0 | Status: SHIPPED | OUTPATIENT
Start: 2017-05-18 | End: 2017-05-26

## 2017-05-18 RX ORDER — LIDOCAINE HYDROCHLORIDE 10 MG/ML
20 INJECTION, SOLUTION EPIDURAL; INFILTRATION; INTRACAUDAL; PERINEURAL ONCE
Status: COMPLETED | OUTPATIENT
Start: 2017-05-18 | End: 2017-05-18

## 2017-05-18 RX ORDER — FENTANYL CITRATE 50 UG/ML
INJECTION, SOLUTION INTRAMUSCULAR; INTRAVENOUS AS NEEDED
Status: DISCONTINUED | OUTPATIENT
Start: 2017-05-18 | End: 2017-05-18 | Stop reason: HOSPADM

## 2017-05-18 RX ORDER — LIDOCAINE HYDROCHLORIDE 10 MG/ML
0.1 INJECTION, SOLUTION EPIDURAL; INFILTRATION; INTRACAUDAL; PERINEURAL AS NEEDED
Status: DISCONTINUED | OUTPATIENT
Start: 2017-05-18 | End: 2017-05-18 | Stop reason: HOSPADM

## 2017-05-18 RX ORDER — MIDAZOLAM HYDROCHLORIDE 1 MG/ML
INJECTION, SOLUTION INTRAMUSCULAR; INTRAVENOUS AS NEEDED
Status: DISCONTINUED | OUTPATIENT
Start: 2017-05-18 | End: 2017-05-18 | Stop reason: HOSPADM

## 2017-05-18 RX ORDER — SODIUM CHLORIDE 0.9 % (FLUSH) 0.9 %
5-10 SYRINGE (ML) INJECTION AS NEEDED
Status: DISCONTINUED | OUTPATIENT
Start: 2017-05-18 | End: 2017-05-18 | Stop reason: HOSPADM

## 2017-05-18 RX ORDER — MORPHINE SULFATE 10 MG/ML
2 INJECTION, SOLUTION INTRAMUSCULAR; INTRAVENOUS
Status: DISCONTINUED | OUTPATIENT
Start: 2017-05-18 | End: 2017-05-18 | Stop reason: HOSPADM

## 2017-05-18 RX ORDER — PROPOFOL 10 MG/ML
INJECTION, EMULSION INTRAVENOUS
Status: DISCONTINUED | OUTPATIENT
Start: 2017-05-18 | End: 2017-05-18 | Stop reason: HOSPADM

## 2017-05-18 RX ORDER — FENTANYL CITRATE 50 UG/ML
25 INJECTION, SOLUTION INTRAMUSCULAR; INTRAVENOUS
Status: DISCONTINUED | OUTPATIENT
Start: 2017-05-18 | End: 2017-05-18 | Stop reason: HOSPADM

## 2017-05-18 RX ORDER — HYDROMORPHONE HYDROCHLORIDE 1 MG/ML
.2-.5 INJECTION, SOLUTION INTRAMUSCULAR; INTRAVENOUS; SUBCUTANEOUS ONCE
Status: DISCONTINUED | OUTPATIENT
Start: 2017-05-18 | End: 2017-05-18 | Stop reason: HOSPADM

## 2017-05-18 RX ORDER — DIPHENHYDRAMINE HYDROCHLORIDE 50 MG/ML
12.5 INJECTION, SOLUTION INTRAMUSCULAR; INTRAVENOUS AS NEEDED
Status: DISCONTINUED | OUTPATIENT
Start: 2017-05-18 | End: 2017-05-18 | Stop reason: HOSPADM

## 2017-05-18 RX ADMIN — PROPOFOL 100 MCG/KG/MIN: 10 INJECTION, EMULSION INTRAVENOUS at 12:52

## 2017-05-18 RX ADMIN — FENTANYL CITRATE 25 MCG: 50 INJECTION, SOLUTION INTRAMUSCULAR; INTRAVENOUS at 13:01

## 2017-05-18 RX ADMIN — MIDAZOLAM HYDROCHLORIDE 2 MG: 1 INJECTION, SOLUTION INTRAMUSCULAR; INTRAVENOUS at 12:46

## 2017-05-18 RX ADMIN — SODIUM CHLORIDE, SODIUM LACTATE, POTASSIUM CHLORIDE, AND CALCIUM CHLORIDE 25 ML/HR: 600; 310; 30; 20 INJECTION, SOLUTION INTRAVENOUS at 11:07

## 2017-05-18 RX ADMIN — FENTANYL CITRATE 50 MCG: 50 INJECTION, SOLUTION INTRAMUSCULAR; INTRAVENOUS at 12:52

## 2017-05-18 NOTE — PERIOP NOTES
Shannan Crewsler  1970  [id number]    Situation:  Verbal report given from: Christa Palmer CRNA, Antonieta Cushing, JAVIER  Procedure: Procedure(s):   HYSTEROSCOPY, DILITATION  AND CURRETTAGE    Background:    Preoperative diagnosis: MENORRHAGIA, ANEMIA    Postoperative diagnosis: MENORRHAGIA, ANEMIA    :  Dr. Parveen Ureña    Assistant(s): Circ-1: Paola Lares RN  Scrub Tech-1: Elizabeth Ayala    Specimens:   ID Type Source Tests Collected by Time Destination   1 : endometrial curettings Preservative Endometrial Curetting  Bruno Price MD 5/18/2017 1306 Pathology       Assessment:  Intra-procedure medications         Anesthesia gave intra-procedure sedation and medications, see anesthesia flow sheet     Intravenous fluids: LR@ KVO     Vital signs stable       Recommendation:    Permission to share finding with family or friend yes

## 2017-05-18 NOTE — IP AVS SNAPSHOT
[address] [facility] 
[phone] Patient: Delmi Goldstein MRN: [id number] :1970 You are allergic to the following No active allergies Recent Documentation Height Weight BMI OB Status Smoking Status 1.575 m 59 kg 23.8 kg/m2 Unknown Current Every Day Smoker Emergency Contacts Name Discharge Info Relation Home Work Mobile Florence Maldonado  Friend [5] [phone] About your hospitalization You were admitted on:  May 18, 2017 You last received care in the:  [facility] [place] [facility] You were discharged on:  May 18, 2017 Unit phone number:  [phone] Why you were hospitalized Your primary diagnosis was:  Not on File Providers Seen During Your Hospitalizations Provider Role Specialty Primary office phone Helen Rojo MD Attending Provider Obstetrics & Gynecology [phone] Your Primary Care Physician (PCP) Primary Care Physician Office Phone Office Fax Eliot Asencio [phone] Follow-up Information Follow up With Details Comments Contact Info Aiyana De La Cruz MD   [address] [place] [address] [address] [facility] 
[phone] Your Appointments 2017  8:45 AM EDT ROUTINE CARE with Aiyana De La Cruz, [address] [place] [address] [address] [facility]  
[phone] Current Discharge Medication List  
  
START taking these medications Dose & Instructions Dispensing Information Comments Morning Noon Evening Bedtime  
 oxyCODONE-acetaminophen 5-325 mg per tablet Commonly known as:  PERCOCET Your last dose was: Your next dose is:    
   
   
 Dose:  1 Tab Take 1 Tab by mouth every six (6) hours as needed for Pain. Max Daily Amount: 4 Tabs. Quantity:  15 Tab Refills:  0 ASK your doctor about these medications Dose & Instructions Dispensing Information Comments Morning Noon Evening Bedtime ADVIL 200 mg tablet Generic drug:  ibuprofen Your last dose was: Your next dose is:    
   
   
 Dose:  400 mg Take 400 mg by mouth every six (6) hours as needed for Pain. Refills:  0  
     
   
   
   
  
 escitalopram oxalate 10 mg tablet Commonly known as:  Carlos Marsh Your last dose was: Your next dose is:    
   
   
 Dose:  10 mg Take 1 Tab by mouth daily. Quantity:  30 Tab Refills:  3  
     
   
   
   
  
 levothyroxine 125 mcg tablet Commonly known as:  SYNTHROID Your last dose was: Your next dose is:    
   
   
 Dose:  125 mcg Take 1 Tab by mouth Daily (before breakfast). Quantity:  30 Tab Refills:  6  
     
   
   
   
  
 liothyronine 25 mcg tablet Commonly known as:  CYTOMEL Your last dose was: Your next dose is:    
   
   
 Dose:  25 mcg Take 1 Tab by mouth daily. Quantity:  90 Tab Refills:  3 Where to Get Your Medications Information on where to get these meds will be given to you by the nurse or doctor. ! Ask your nurse or doctor about these medications  
  oxyCODONE-acetaminophen 5-325 mg per tablet Discharge Instructions Call if you have heavy bleeding, severe pain, fever or vomiting. Dr Trace Salas will call with your pathology result. No need to be seen in the office before your hysterectomy unless you are having problems. May shower tomorrow. No tampons, intercourse or douching. DO NOT TAKE TYLENOL/ACETAMINOPHEN WITH PERCOCET, LORTAB, [address] TAKE NARCOTIC PAIN MEDICATIONS WITH FOOD Narcotics tend to be constipating, we suggest taking a stool softener such as Colace or Miralax (follow package instructions). DO NOT DRIVE WHILE TAKING NARCOTIC PAIN MEDICATIONS. DO NOT TAKE SLEEPING MEDICATIONS OR ANTIANXIETY MEDICATIONS WHILE TAKING NARCOTIC PAIN MEDICATIONS,  ESPECIALLY THE NIGHT OF ANESTHESIA. CPAP PATIENTS BE SURE TO WEAR MACHINE WHENEVER NAPPING OR SLEEPING. DISCHARGE SUMMARY from Nurse The following personal items collected during your admission are returned to you:  
Dental Appliance: Dental Appliances: None Vision: Visual Aid: Glasses (in personal belongings bag) Hearing Aid:   
Jewelry:   
Clothing: Clothing:  (under stretcher, ring to SO) Other Valuables:   
Valuables sent to safe:   
 
 
PATIENT INSTRUCTIONS: 
 
 
B - Balance E - Eyes F-  Face looks uneven A-  Arms unable to move or move even S-  Speech slurred or non-existent T-  Time-call 911 as soon as signs and symptoms begin-DO NOT go Back to bed or wait to see if you get better-TIME IS BRAIN. If you have not received your influenza and/or pneumococcal vaccine, please follow up with your primary care physician. The discharge information has been reviewed with the patient and caregiver. The patient and caregiver verbalized understanding. [address] THROMBOSIS AND PULMONARY EMBOLUS 
 
SURGICAL PATIENTS Surgical patients are the #1 risk for DVT and PE. WHAT IS DVT? WHAT IS PE? 
DVT is a serious condition where blood clots develop deep in the veins of the legs. PE occurs when a blood clot breaks loose from the wall of a vein and travels to the lungs blocking the pulmonary artery or one of its branches impairing blood flow from the heart, which could result in death. RISK FACTORS 
? Surgery lasting longer than 45 minutes ? History of inflammatory bowel disease 
? Oral contraceptive or hormone replacement therapy ? Immobilization ? Varicose veins / swollen legs ? Smoking  
? CHF / Acute MI / Irregular heart beat ? Family history of thrombosis ? General anesthesia greater than 2 hours ? Obesity ? Infection of less than one month ? Less than 1 month postpartum 
? COPD / Pneumonia ? Arthroscopic surgery ? Malignancy / cancer ? Spine surgery ? Blood abnormalities ? Stroke / Paralysis / Coma SIGNS AND SYMPTOMS OF DEEP VEIN TROMBOSIS Usually occurs in one leg, above or below the knee ? Swelling  one calf or thigh may be larger than the other ? Feeling increased warmth in the area of the leg that is swollen or painful ? Leg pain, which may increase when standing or walking ? Swelling along the vein of the leg ? When swollen areas is pressed with a finger, a depression may remain ? Tenderness of the leg that may be confined to one area ? Change in leg color (bluish or red) SIGNS AND SYMPTOMS OF PULMONARY EMBOLUS 
? Chest pain that gets worse with deep breath, coughing or chest movement ? Coughing up blood ? Sweating ? Shortness of breath or difficulty breathing ? Rapid heart beat ? Lightheadedness HOW TO REDUCE THE POSSIBLE RISK OF DVT ? Exercise  simple activities as rotating ankles and wrists, wiggling toes and fingers, tightening and relaxing muscles in calves and thighs promotes circulation while recovering from surgery. Please do these exercises every hour during waking hours ? Take mediation as prescribed by your physician (Lovenox, Coumadin, Aspirin) ? Resume your normal activities as soon as your doctor advises you to do so. 
? Remember, when traveling, to [place] your legs frequently. PATIENTS WHO BELIEVE THEY MAY BE EXPERIENCING SIGNS AND SYMPTOMS OF DVT OR PE SHOULD SEEK MEDICAL HELP IMMEDIATELY Discharge Instructions Attachments/References MEFS - OXYCODONE/ACETAMINOPHEN (PERCOCET, ROXICET) - (BY MOUTH) (ENGLISH) Discharge Orders None Introducing [facility]! Lizet Marquez introduces [employer] patient portal. Now you can access parts of your medical record, email your doctor's office, and request medication refills online. 1. In your internet browser, go to [email or web address] 2. Click on the First Time User? Click Here link in the Sign In box. You will see the New Member Sign Up page. 3. Enter your [employer] Access Code exactly as it appears below. You will not need to use this code after youve completed the sign-up process. If you do not sign up before the expiration date, you must request a new code. · [employer] Access Code: [email or web address] Expires: 8/16/2017  1:14 PM 
 
4. Enter the last four digits of your Social Security Number (xxxx) and Date of Birth (mm/dd/yyyy) as indicated and click Submit. You will be taken to the next sign-up page. 5. Create a [employer] ID. This will be your [employer] login ID and cannot be changed, so think of one that is secure and easy to remember. 6. Create a [employer] password. You can change your password at any time. 7. Enter your Password Reset Question and Answer. This can be used at a later time if you forget your password. 8. Enter your e-mail address. You will receive e-mail notification when new information is available in [address] 9. Click Sign Up. You can now view and download portions of your medical record. 10. Click the Download Summary menu link to download a portable copy of your medical information. If you have questions, please visit the Frequently Asked Questions section of the [employer] website. Remember, [employer] is NOT to be used for urgent needs. For medical emergencies, dial 911. Now available from your iPhone and Android! General Information Please provide this summary of care documentation to your next provider. Patient Signature:  ____________________________________________________________ Date:  ____________________________________________________________  
  
Baron Dunham Provider Signature:  ____________________________________________________________ Date:  ____________________________________________________________ More Information Oxycodone/Acetaminophen (Percocet, Roxicet) - (By mouth) Why this medicine is used:  
Treats pain. This medicine contains a narcotic pain reliever. Contact a nurse or doctor right away if you have: 
· Extreme weakness, shallow breathing, slow heartbeat · Sweating or cold, clammy skin · Skin blisters, rash, or peeling Common side effects: · Constipation · Nausea, vomiting · Tiredness © 2017 [address] Information is for End User's use only and may not be sold, redistributed or otherwise used for commercial purposes.

## 2017-05-18 NOTE — ANESTHESIA POSTPROCEDURE EVALUATION
Post-Anesthesia Evaluation and Assessment    Patient: Alexx Salgado MRN: [id number]  SSN: [id number]    YOB: 1970  Age: 48 y.o. Sex: female       Cardiovascular Function/Vital Signs  Visit Vitals    /80 (BP 1 Location: Left arm, BP Patient Position: At rest)    Pulse 69    Temp 36.9 °C (98.5 °F)    Resp 11    Ht 5' 2\" (1.575 m)    Wt 59 kg (130 lb 2 oz)    SpO2 98%    BMI 23.8 kg/m2       Patient is status post general, total IV anesthesia anesthesia for Procedure(s): HYSTEROSCOPY, DILITATION  AND CURRETTAGE. Nausea/Vomiting: None    Postoperative hydration reviewed and adequate. Pain:  Pain Scale 1: Numeric (0 - 10) (05/18/17 1346)  Pain Intensity 1: 7 (05/18/17 1346)   Managed    Neurological Status:   Neuro (WDL): Within Defined Limits (05/18/17 1346)  Neuro  Neurologic State: Drowsy; Eyes open spontaneously (05/18/17 1326)   At baseline    Mental Status and Level of Consciousness: Arousable    Pulmonary Status:   O2 Device: Room air (05/18/17 1346)   Adequate oxygenation and airway patent    Complications related to anesthesia: None    Post-anesthesia assessment completed.  No concerns    Signed By: Melissa Blancas MD     May 18, 2017

## 2017-05-18 NOTE — DISCHARGE INSTRUCTIONS
Call if you have heavy bleeding, severe pain, fever or vomiting. Dr Izaiah Quinones will call with your pathology result. No need to be seen in the office before your hysterectomy unless you are having problems. May shower tomorrow. No tampons, intercourse or douching. DO NOT TAKE TYLENOL/ACETAMINOPHEN WITH PERCOCET, LORTAB, [address] TAKE NARCOTIC PAIN MEDICATIONS WITH FOOD   Narcotics tend to be constipating, we suggest taking a stool softener such as Colace or Miralax (follow package instructions). DO NOT DRIVE WHILE TAKING NARCOTIC PAIN MEDICATIONS. DO NOT TAKE SLEEPING MEDICATIONS OR ANTIANXIETY MEDICATIONS WHILE TAKING NARCOTIC PAIN MEDICATIONS,  ESPECIALLY THE NIGHT OF ANESTHESIA. CPAP PATIENTS BE SURE TO WEAR MACHINE WHENEVER NAPPING OR SLEEPING. DISCHARGE SUMMARY from Nurse    The following personal items collected during your admission are returned to you:   Dental Appliance: Dental Appliances: None  Vision: Visual Aid: Glasses (in personal belongings bag)  Hearing Aid:    Jewelry:    Clothing: Clothing:  (under stretcher, ring to SO)  Other Valuables:    Valuables sent to safe:        PATIENT INSTRUCTIONS:    After General Anesthesia or Intravenous Sedation, for 24 hours or while taking prescription Narcotics:        Someone should be with you for the next 24 hours. For your own safety, a responsible adult must drive you home. · Limit your activities  · Recommended activity: Rest today, up with assistance today. Do not climb stairs or shower unattended for the next 24 hours. · Please start with a soft bland diet and advance as tolerated (no nausea) to regular diet. · If you have a sore throat you should try the following: fluids, warm salt water gargles, or throat lozenges. If it does not improve after several days please follow up with your primary physician.   · Do not drive and operate hazardous machinery  · Do not make important personal or business decisions  · Do  not drink alcoholic beverages  · If you have not urinated within 8 hours after discharge, please contact your surgeon on call. Report the following to your surgeon:  · Excessive pain, swelling, redness or odor of or around the surgical area  · Temperature over 100.5  · Nausea and vomiting lasting longer than 4 hours or if unable to take medications  · Any signs of decreased circulation or nerve impairment to extremity: change in color, persistent  numbness, tingling, coldness or increase pain      · You will receive a Post Operative Call from one of the Recovery Room Nurses on the day after your surgery to check on you. It is very important for us to know how you are recovering after your surgery. If you have an issue or need to speak with someone, please call your surgeon, do not wait for the post operative call. · You may receive an e-mail or letter in the mail from [employer] regarding your experience with us in the Ambulatory Surgery Unit. Your feedback is valuable to us and we appreciate your participation in the survey. · If the above instructions are not adequate, please contact Delfin Bradley RN, Ch[facility] anesthesia Nurse Manager or our Anesthesiologist, at [phone]. If you are having problems after your surgery, call the physician at his office number. · We wish you a speedy recovery ? What to do at Home:      *  Please give a list of your current medications to your Primary Care Provider. *  Please update this list whenever your medications are discontinued, doses are      changed, or new medications (including over-the-counter products) are added. *  Please carry medication information at all times in case of emergency situations. These are general instructions for a healthy lifestyle:    No smoking/ No tobacco products/ Avoid exposure to second hand smoke    Surgeon General's Warning:  Quitting smoking now greatly reduces serious risk to your health.     Obesity, smoking, and sedentary lifestyle greatly increases your risk for illness    A healthy diet, regular physical exercise & weight monitoring are important for maintaining a healthy lifestyle    You may be retaining fluid if you have a history of heart failure or if you experience any of the following symptoms:  Weight gain of 3 pounds or more overnight or 5 pounds in a week, increased swelling in our hands or feet or shortness of breath while lying flat in bed. Please call your doctor as soon as you notice any of these symptoms; do not wait until your next office visit. Recognize signs and symptoms of STROKE:    B - Balance  E - Eyes    F-  Face looks uneven    A-  Arms unable to move or move even    S-  Speech slurred or non-existent    T-  Time-call 911 as soon as signs and symptoms begin-DO NOT go       Back to bed or wait to see if you get better-TIME IS BRAIN. If you have not received your influenza and/or pneumococcal vaccine, please follow up with your primary care physician. The discharge information has been reviewed with the patient and caregiver. The patient and caregiver verbalized understanding. Denilson Mckeon THROMBOSIS AND PULMONARY EMBOLUS    SURGICAL PATIENTS  Surgical patients are the #1 risk for DVT and PE. WHAT IS DVT? WHAT IS PE?  DVT is a serious condition where blood clots develop deep in the veins of the legs. PE occurs when a blood clot breaks loose from the wall of a vein and travels to the lungs blocking the pulmonary artery or one of its branches impairing blood flow from the heart, which could result in death.   RISK FACTORS   Surgery lasting longer than 45 minutes   History of inflammatory bowel disease   Oral contraceptive or hormone replacement therapy   Immobilization   Varicose veins / swollen legs   Smoking    CHF / Acute MI / Irregular heart beat   Family history of thrombosis   General anesthesia greater than 2 hours   Obesity   Infection of less than one month   Less than 1 month postpartum   COPD / Pneumonia   Arthroscopic surgery   Malignancy / cancer   Spine surgery   Blood abnormalities   Stroke / Paralysis / Coma    SIGNS AND SYMPTOMS OF DEEP VEIN TROMBOSIS  Usually occurs in one leg, above or below the knee   Swelling - one calf or thigh may be larger than the other   Feeling increased warmth in the area of the leg that is swollen or painful   Leg pain, which may increase when standing or walking   Swelling along the vein of the leg   When swollen areas is pressed with a finger, a depression may remain   Tenderness of the leg that may be confined to one area   Change in leg color (bluish or red)    SIGNS AND SYMPTOMS OF PULMONARY EMBOLUS   Chest pain that gets worse with deep breath, coughing or chest movement   Coughing up blood   Sweating   Shortness of breath or difficulty breathing   Rapid heart beat   Lightheadedness    HOW TO REDUCE THE POSSIBLE RISK OF DVT   Exercise - simple activities as rotating ankles and wrists, wiggling toes and fingers, tightening and relaxing muscles in calves and thighs promotes circulation while recovering from surgery. Please do these exercises every hour during waking hours   Take mediation as prescribed by your physician (Lovenox, Coumadin, Aspirin)   Resume your normal activities as soon as your doctor advises you to do so.  Remember, when traveling, to [place] your legs frequently.       PATIENTS WHO BELIEVE THEY MAY BE EXPERIENCING SIGNS AND SYMPTOMS OF DVT OR PE SHOULD SEEK MEDICAL HELP IMMEDIATELY

## 2017-05-18 NOTE — ANESTHESIA PREPROCEDURE EVALUATION
Anesthetic History   No history of anesthetic complications            Review of Systems / Medical History  Patient summary reviewed, nursing notes reviewed and pertinent labs reviewed    Pulmonary          Smoker (1 ppd)         Neuro/Psych         Psychiatric history (depression)     Cardiovascular                  Exercise tolerance: >4 METS     GI/Hepatic/Renal       Hepatitis (remote hx): type A         Endo/Other      Hypothyroidism (h/o Grave's Dz): well controlled  Anemia ( s/p transfusion with PRBCs x2  04/17)     Other Findings              Physical Exam    Airway  Mallampati: II  TM Distance: 4 - 6 cm  Neck ROM: normal range of motion   Mouth opening: Normal     Cardiovascular    Rhythm: regular  Rate: normal      Pertinent negatives: No murmur   Dental    Dentition: Full lower dentures and Full upper dentures     Pulmonary  Breath sounds clear to auscultation               Abdominal  GI exam deferred       Other Findings            Anesthetic Plan    ASA: 2  Anesthesia type: general and total IV anesthesia          Induction: Intravenous  Anesthetic plan and risks discussed with: Patient

## 2017-05-18 NOTE — H&P
Gynecology History and Physical    Name: Blair Horta MRN: [id number] SSN: [id number]    YOB: 1970  Age: 48 y.o. Sex: female       Subjective:      Chief complaint:  Abnormal uterine bleeding    [place] is a 48 y.o.  female with a history of menorrhagia. Previous workup included Ultrasound which revealed fibroid(s). Previous treatment measures included none. She is admitted for Procedure(s) (LRB):  HYSTEROSCOPY, D AND C (N/A). The current method of family planning is none. OB History     No data available        Past Medical History:   Diagnosis Date    Anemia     Depression     Graves disease     Hepatitis A age 10    treated    History of blood transfusion 04/27/2017    2 units PRBC    Menorrhagia      Past Surgical History:   Procedure Laterality Date    HX COLONOSCOPY       Social History     Occupational History    Not on file. Social History Main Topics    Smoking status: Current Every Day Smoker     Packs/day: 1.00     Years: 30.00    Smokeless tobacco: Never Used    Alcohol use 4.2 oz/week     7 Shots of liquor per week    Drug use: No    Sexual activity: Yes     Partners: Male     Family History   Problem Relation Age of Onset    Cancer Mother      thyroid cancer and depression    Psychiatric Disorder Mother     Diabetes Brother     Heart Disease Brother         No Known Allergies  Prior to Admission medications    Medication Sig Start Date End Date Taking? Authorizing Provider   ibuprofen (ADVIL) 200 mg tablet Take 400 mg by mouth every six (6) hours as needed for Pain. Yes Historical Provider   levothyroxine (SYNTHROID) 125 mcg tablet Take 1 Tab by mouth Daily (before breakfast). 4/21/17  Yes Emilee Boswell MD   escitalopram oxalate (LEXAPRO) 10 mg tablet Take 1 Tab by mouth daily. Patient taking differently: Take 10 mg by mouth nightly. 4/18/17  Yes Emilee Boswell MD   liothyronine (CYTOMEL) 25 mcg tablet Take 1 Tab by mouth daily.  2/16/17  Yes Shoa Romano MD        Review of Systems:  A comprehensive review of systems was negative except for that written in the History of Present Illness. Objective:     Vitals:    05/15/17 1402 05/18/17 1056   BP:  132/84   Pulse:  93   Resp:  12   Temp:  98.7 °F (37.1 °C)   SpO2:  96%   Weight: 59.9 kg (132 lb) 59 kg (130 lb 2 oz)   Height: 5' 2\" (1.575 m)        Physical Exam:  Deferred    Assessment:     Active Problems:    * No active hospital problems. *     Abnormal uterine bleeding with anemia    Plan:     Procedure(s) (LRB):  HYSTEROSCOPY, D AND C (N/A)  Discussed the risks of surgery including the risks of bleeding, infection, deep vein thrombosis, and surgical injuries to internal organs including but not limited to the bowels, bladder, rectum, and female reproductive organs. The patient understands the risks; any and all questions were answered to the patient's satisfaction.     Signed By:  Orion Lopez MD     May 18, 2017

## 2017-05-18 NOTE — PERIOP NOTES
Reviewed discharge instructions w/pt and significant other. They verbalized understanding. Pt. Denies any pain to abdomen. C/o headache level 7/10. Offered medication. Pt. Declined. Stated she drinks a lot of coffee and that will make it better when she leaves. Pt. And significant other stated they are comfortable going home. Pt discharged via wheelchair, accompanied by RN. Pt discharged awake and alert, respirations equal and unlabored, skin warm, dry, and intact. Pt and family members' questions and concerns addressed prior to discharge.

## 2017-05-18 NOTE — OP NOTES
[address]   OP NOTE       Name:  Cecil Mcfarland   MR#:  [id number]   :  1970   Account #:  [de-identified]    Surgery Date:  2017   Date of Adm:  2017       PREOPERATIVE DIAGNOSES    1. Irregular menses. 2. Fibroid uterus. POSTOPERATIVE DIAGNOSES    1. Irregular menses. 2. Fibroid uterus. PROCEDURES PERFORMED: Hysteroscopy, D and C.    SURGEON: Humberto Zambrano MD    ANESTHESIA: IV sedation. FINDINGS: With hysteroscopy, there were no endometrial polyps or   fibroids. Saline was used for distending media. Less than 100 mL total   were used. ESTIMATED BLOOD LOSS: 15 mL. DESCRIPTION OF PROCEDURE: The patient was taken to the   operating room, placed on the operating room table. After adequate   anesthesia was obtained, she was placed in the dorsal lithotomy   position and the perineum and vagina were prepped and the patient   was draped in the usual sterile fashion. A sterile speculum was placed. A single-tooth tenaculum was placed on the anterior lip of the cervix. A   paracervical block was performed using Marcaine. The cervix was   dilated and the diagnostic hysteroscope was inserted with findings as   above. When this was complete, the hysteroscope was removed and a   smooth curette was introduced and endometrial curettage was   performed. All instruments were removed from the vagina and there   was good hemostasis from the vagina. All sponge, needle and   instrument counts were correct x2 at the end of the procedure. The   patient tolerated the procedure well and was taken to the post-  anesthesia care unit in satisfactory condition. PATHOLOGY: Endometrial curettings.         Kristopher Fields MD      [facility] / [facility]   D:  2017   16:52   T:  2017   17:28   Job #:  [id number]

## 2017-05-26 ENCOUNTER — HOSPITAL ENCOUNTER (OUTPATIENT)
Dept: PREADMISSION TESTING | Age: 47
Discharge: HOME | End: 2017-05-26
Payer: COMMERCIAL

## 2017-05-26 ENCOUNTER — HOSPITAL ENCOUNTER (OUTPATIENT)
Dept: GENERAL RADIOLOGY | Age: 47
Discharge: HOME | End: 2017-05-26
Payer: COMMERCIAL

## 2017-05-26 VITALS
HEIGHT: 62 IN | WEIGHT: 133.38 LBS | OXYGEN SATURATION: 99 % | RESPIRATION RATE: 18 BRPM | DIASTOLIC BLOOD PRESSURE: 74 MMHG | TEMPERATURE: 98.6 F | BODY MASS INDEX: 24.54 KG/M2 | SYSTOLIC BLOOD PRESSURE: 129 MMHG | HEART RATE: 80 BPM

## 2017-05-26 LAB
ABO + RH BLD: NORMAL
ALBUMIN SERPL BCP-MCNC: 3.5 G/DL (ref 3.5–5)
ALBUMIN/GLOB SERPL: 1.1 {RATIO} (ref 1.1–2.2)
ALP SERPL-CCNC: 72 U/L (ref 45–117)
ALT SERPL-CCNC: 23 U/L (ref 12–78)
ANION GAP BLD CALC-SCNC: 5 MMOL/L (ref 5–15)
AST SERPL W P-5'-P-CCNC: 20 U/L (ref 15–37)
ATRIAL RATE: 77 BPM
BASOPHILS # BLD AUTO: 0 K/UL (ref 0–0.1)
BASOPHILS # BLD: 0 % (ref 0–1)
BILIRUB SERPL-MCNC: 0.4 MG/DL (ref 0.2–1)
BLOOD GROUP ANTIBODIES SERPL: NORMAL
BUN SERPL-MCNC: 6 MG/DL (ref 6–20)
BUN/CREAT SERPL: 13 (ref 12–20)
CALCIUM SERPL-MCNC: 9.2 MG/DL (ref 8.5–10.1)
CALCULATED P AXIS, ECG09: 69 DEGREES
CALCULATED R AXIS, ECG10: -3 DEGREES
CALCULATED T AXIS, ECG11: 57 DEGREES
CHLORIDE SERPL-SCNC: 111 MMOL/L (ref 97–108)
CO2 SERPL-SCNC: 24 MMOL/L (ref 21–32)
CREAT SERPL-MCNC: 0.48 MG/DL (ref 0.55–1.02)
DIAGNOSIS, 93000: NORMAL
DIFFERENTIAL METHOD BLD: ABNORMAL
EOSINOPHIL # BLD: 0.1 K/UL (ref 0–0.4)
EOSINOPHIL NFR BLD: 1 % (ref 0–7)
ERYTHROCYTE [DISTWIDTH] IN BLOOD BY AUTOMATED COUNT: 25.9 % (ref 11.5–14.5)
GLOBULIN SER CALC-MCNC: 3.2 G/DL (ref 2–4)
GLUCOSE SERPL-MCNC: 82 MG/DL (ref 65–100)
HCT VFR BLD AUTO: 31.3 % (ref 35–47)
HGB BLD-MCNC: 9.2 G/DL (ref 11.5–16)
LYMPHOCYTES # BLD AUTO: 21 % (ref 12–49)
LYMPHOCYTES # BLD: 1.7 K/UL (ref 0.8–3.5)
MCH RBC QN AUTO: 20.9 PG (ref 26–34)
MCHC RBC AUTO-ENTMCNC: 29.4 G/DL (ref 30–36.5)
MCV RBC AUTO: 71 FL (ref 80–99)
MONOCYTES # BLD: 0.8 K/UL (ref 0–1)
MONOCYTES NFR BLD AUTO: 10 % (ref 5–13)
NEUTS SEG # BLD: 5.3 K/UL (ref 1.8–8)
NEUTS SEG NFR BLD AUTO: 68 % (ref 32–75)
P-R INTERVAL, ECG05: 130 MS
PLATELET # BLD AUTO: 251 K/UL (ref 150–400)
POTASSIUM SERPL-SCNC: 5.2 MMOL/L (ref 3.5–5.1)
PROT SERPL-MCNC: 6.7 G/DL (ref 6.4–8.2)
Q-T INTERVAL, ECG07: 396 MS
QRS DURATION, ECG06: 80 MS
QTC CALCULATION (BEZET), ECG08: 448 MS
RBC # BLD AUTO: 4.41 M/UL (ref 3.8–5.2)
RBC MORPH BLD: ABNORMAL
RBC MORPH BLD: ABNORMAL
SODIUM SERPL-SCNC: 140 MMOL/L (ref 136–145)
SPECIMEN EXP DATE BLD: NORMAL
VENTRICULAR RATE, ECG03: 77 BPM
WBC # BLD AUTO: 7.9 K/UL (ref 3.6–11)

## 2017-05-26 PROCEDURE — 80053 COMPREHEN METABOLIC PANEL: CPT | Performed by: SPECIALIST

## 2017-05-26 PROCEDURE — 71020 XR CHEST PA LAT: CPT

## 2017-05-26 PROCEDURE — 85025 COMPLETE CBC W/AUTO DIFF WBC: CPT | Performed by: SPECIALIST

## 2017-05-26 PROCEDURE — 93005 ELECTROCARDIOGRAM TRACING: CPT

## 2017-05-26 PROCEDURE — 86900 BLOOD TYPING SEROLOGIC ABO: CPT | Performed by: SPECIALIST

## 2017-05-26 PROCEDURE — 36415 COLL VENOUS BLD VENIPUNCTURE: CPT | Performed by: SPECIALIST

## 2017-05-26 RX ORDER — LEVOTHYROXINE SODIUM 150 UG/1
150 TABLET ORAL
COMMUNITY
End: 2017-06-20 | Stop reason: SDUPTHER

## 2017-05-27 ENCOUNTER — ANESTHESIA EVENT (OUTPATIENT)
Dept: SURGERY | Age: 47
End: 2017-05-27
Payer: COMMERCIAL

## 2017-05-30 ENCOUNTER — ANESTHESIA (OUTPATIENT)
Dept: SURGERY | Age: 47
End: 2017-05-30
Payer: COMMERCIAL

## 2017-05-30 ENCOUNTER — HOSPITAL ENCOUNTER (OUTPATIENT)
Age: 47
Setting detail: OBSERVATION
Discharge: HOME | End: 2017-05-31
Attending: SPECIALIST | Admitting: SPECIALIST
Payer: COMMERCIAL

## 2017-05-30 PROCEDURE — 77030016151 HC PROTCTR LNS DFOG COVD -B: Performed by: SPECIALIST

## 2017-05-30 PROCEDURE — 76060000033 HC ANESTHESIA 1 TO 1.5 HR: Performed by: SPECIALIST

## 2017-05-30 PROCEDURE — 74011250636 HC RX REV CODE- 250/636

## 2017-05-30 PROCEDURE — 77030008756 HC TU IRR SUC STRY -B: Performed by: SPECIALIST

## 2017-05-30 PROCEDURE — 76210000016 HC OR PH I REC 1 TO 1.5 HR: Performed by: SPECIALIST

## 2017-05-30 PROCEDURE — 77030026438 HC STYL ET INTUB CARD -A: Performed by: ANESTHESIOLOGY

## 2017-05-30 PROCEDURE — 77030020782 HC GWN BAIR PAWS FLX 3M -B

## 2017-05-30 PROCEDURE — 74011250636 HC RX REV CODE- 250/636: Performed by: SPECIALIST

## 2017-05-30 PROCEDURE — 74011250636 HC RX REV CODE- 250/636: Performed by: ANESTHESIOLOGY

## 2017-05-30 PROCEDURE — 77030005518 HC CATH URETH FOL 2W BARD -B: Performed by: SPECIALIST

## 2017-05-30 PROCEDURE — 77030010507 HC ADH SKN DERMBND J&J -B: Performed by: SPECIALIST

## 2017-05-30 PROCEDURE — 77030002933 HC SUT MCRYL J&J -A: Performed by: SPECIALIST

## 2017-05-30 PROCEDURE — 76010000934 HC OR TIME 1 TO 1.5HR INTENSV - TIER 2: Performed by: SPECIALIST

## 2017-05-30 PROCEDURE — 77030013079 HC BLNKT BAIR HGGR 3M -A: Performed by: ANESTHESIOLOGY

## 2017-05-30 PROCEDURE — 77030033067 HC SUT PDO STRATFX SPIR J&J -B: Performed by: SPECIALIST

## 2017-05-30 PROCEDURE — 77030018778 HC MANIP UTER VCAR CNMD -B: Performed by: SPECIALIST

## 2017-05-30 PROCEDURE — 88307 TISSUE EXAM BY PATHOLOGIST: CPT | Performed by: SPECIALIST

## 2017-05-30 PROCEDURE — 77030003580 HC NDL INSUF VERES J&J -B: Performed by: SPECIALIST

## 2017-05-30 PROCEDURE — 99218 HC RM OBSERVATION: CPT

## 2017-05-30 PROCEDURE — 77030035488 HC SEAL UNIV DISP INTU -C: Performed by: SPECIALIST

## 2017-05-30 PROCEDURE — 74011000250 HC RX REV CODE- 250

## 2017-05-30 PROCEDURE — 77030035277 HC OBTRTR BLDELSS DISP INTU -B: Performed by: SPECIALIST

## 2017-05-30 PROCEDURE — 77030032490 HC SLV COMPR SCD KNE COVD -B: Performed by: SPECIALIST

## 2017-05-30 PROCEDURE — 77030008557 HC TBNG SMK EVAC STOR -B: Performed by: SPECIALIST

## 2017-05-30 PROCEDURE — 77030018836 HC SOL IRR NACL ICUM -A: Performed by: SPECIALIST

## 2017-05-30 PROCEDURE — 77030008684 HC TU ET CUF COVD -B: Performed by: ANESTHESIOLOGY

## 2017-05-30 PROCEDURE — 77030011640 HC PAD GRND REM COVD -A: Performed by: SPECIALIST

## 2017-05-30 PROCEDURE — 74011000250 HC RX REV CODE- 250: Performed by: SPECIALIST

## 2017-05-30 RX ORDER — SODIUM CHLORIDE 0.9 % (FLUSH) 0.9 %
5-10 SYRINGE (ML) INJECTION AS NEEDED
Status: DISCONTINUED | OUTPATIENT
Start: 2017-05-30 | End: 2017-05-30 | Stop reason: HOSPADM

## 2017-05-30 RX ORDER — DIPHENHYDRAMINE HYDROCHLORIDE 50 MG/ML
12.5 INJECTION, SOLUTION INTRAMUSCULAR; INTRAVENOUS
Status: DISCONTINUED | OUTPATIENT
Start: 2017-05-30 | End: 2017-05-31 | Stop reason: HOSPADM

## 2017-05-30 RX ORDER — NEOSTIGMINE METHYLSULFATE 1 MG/ML
INJECTION INTRAVENOUS AS NEEDED
Status: DISCONTINUED | OUTPATIENT
Start: 2017-05-30 | End: 2017-05-30 | Stop reason: HOSPADM

## 2017-05-30 RX ORDER — HYDROCODONE BITARTRATE AND ACETAMINOPHEN 10; 325 MG/1; MG/1
1 TABLET ORAL
Status: DISCONTINUED | OUTPATIENT
Start: 2017-05-30 | End: 2017-05-31 | Stop reason: HOSPADM

## 2017-05-30 RX ORDER — ROCURONIUM BROMIDE 10 MG/ML
INJECTION, SOLUTION INTRAVENOUS AS NEEDED
Status: DISCONTINUED | OUTPATIENT
Start: 2017-05-30 | End: 2017-05-30 | Stop reason: HOSPADM

## 2017-05-30 RX ORDER — SODIUM CHLORIDE 0.9 % (FLUSH) 0.9 %
5-10 SYRINGE (ML) INJECTION EVERY 8 HOURS
Status: DISCONTINUED | OUTPATIENT
Start: 2017-05-30 | End: 2017-05-30 | Stop reason: HOSPADM

## 2017-05-30 RX ORDER — ROPIVACAINE HYDROCHLORIDE 5 MG/ML
150 INJECTION, SOLUTION EPIDURAL; INFILTRATION; PERINEURAL AS NEEDED
Status: DISCONTINUED | OUTPATIENT
Start: 2017-05-30 | End: 2017-05-30 | Stop reason: HOSPADM

## 2017-05-30 RX ORDER — SODIUM CHLORIDE, SODIUM LACTATE, POTASSIUM CHLORIDE, CALCIUM CHLORIDE 600; 310; 30; 20 MG/100ML; MG/100ML; MG/100ML; MG/100ML
125 INJECTION, SOLUTION INTRAVENOUS CONTINUOUS
Status: DISCONTINUED | OUTPATIENT
Start: 2017-05-30 | End: 2017-05-31 | Stop reason: HOSPADM

## 2017-05-30 RX ORDER — LIDOCAINE HYDROCHLORIDE 10 MG/ML
0.1 INJECTION, SOLUTION EPIDURAL; INFILTRATION; INTRACAUDAL; PERINEURAL AS NEEDED
Status: DISCONTINUED | OUTPATIENT
Start: 2017-05-30 | End: 2017-05-30 | Stop reason: HOSPADM

## 2017-05-30 RX ORDER — ENOXAPARIN SODIUM 100 MG/ML
40 INJECTION SUBCUTANEOUS ONCE
Status: COMPLETED | OUTPATIENT
Start: 2017-05-30 | End: 2017-05-30

## 2017-05-30 RX ORDER — SUCCINYLCHOLINE CHLORIDE 20 MG/ML
INJECTION INTRAMUSCULAR; INTRAVENOUS AS NEEDED
Status: DISCONTINUED | OUTPATIENT
Start: 2017-05-30 | End: 2017-05-30 | Stop reason: HOSPADM

## 2017-05-30 RX ORDER — ACETAMINOPHEN 325 MG/1
650 TABLET ORAL
Status: DISCONTINUED | OUTPATIENT
Start: 2017-05-30 | End: 2017-05-31 | Stop reason: HOSPADM

## 2017-05-30 RX ORDER — HYDROMORPHONE HYDROCHLORIDE 1 MG/ML
0.2 INJECTION, SOLUTION INTRAMUSCULAR; INTRAVENOUS; SUBCUTANEOUS
Status: DISCONTINUED | OUTPATIENT
Start: 2017-05-30 | End: 2017-05-30 | Stop reason: HOSPADM

## 2017-05-30 RX ORDER — MIDAZOLAM HYDROCHLORIDE 1 MG/ML
0.5 INJECTION, SOLUTION INTRAMUSCULAR; INTRAVENOUS
Status: DISCONTINUED | OUTPATIENT
Start: 2017-05-30 | End: 2017-05-30 | Stop reason: HOSPADM

## 2017-05-30 RX ORDER — FENTANYL CITRATE 50 UG/ML
50 INJECTION, SOLUTION INTRAMUSCULAR; INTRAVENOUS AS NEEDED
Status: DISCONTINUED | OUTPATIENT
Start: 2017-05-30 | End: 2017-05-30 | Stop reason: HOSPADM

## 2017-05-30 RX ORDER — MIDAZOLAM HYDROCHLORIDE 1 MG/ML
INJECTION, SOLUTION INTRAMUSCULAR; INTRAVENOUS AS NEEDED
Status: DISCONTINUED | OUTPATIENT
Start: 2017-05-30 | End: 2017-05-30 | Stop reason: HOSPADM

## 2017-05-30 RX ORDER — SODIUM CHLORIDE 0.9 % (FLUSH) 0.9 %
5-10 SYRINGE (ML) INJECTION EVERY 8 HOURS
Status: DISCONTINUED | OUTPATIENT
Start: 2017-05-30 | End: 2017-05-31 | Stop reason: HOSPADM

## 2017-05-30 RX ORDER — HYDROMORPHONE HYDROCHLORIDE 1 MG/ML
INJECTION, SOLUTION INTRAMUSCULAR; INTRAVENOUS; SUBCUTANEOUS AS NEEDED
Status: DISCONTINUED | OUTPATIENT
Start: 2017-05-30 | End: 2017-05-30 | Stop reason: HOSPADM

## 2017-05-30 RX ORDER — MIDAZOLAM HYDROCHLORIDE 1 MG/ML
1 INJECTION, SOLUTION INTRAMUSCULAR; INTRAVENOUS AS NEEDED
Status: DISCONTINUED | OUTPATIENT
Start: 2017-05-30 | End: 2017-05-30 | Stop reason: HOSPADM

## 2017-05-30 RX ORDER — LIDOCAINE HYDROCHLORIDE 20 MG/ML
INJECTION, SOLUTION EPIDURAL; INFILTRATION; INTRACAUDAL; PERINEURAL AS NEEDED
Status: DISCONTINUED | OUTPATIENT
Start: 2017-05-30 | End: 2017-05-30 | Stop reason: HOSPADM

## 2017-05-30 RX ORDER — PROPOFOL 10 MG/ML
INJECTION, EMULSION INTRAVENOUS AS NEEDED
Status: DISCONTINUED | OUTPATIENT
Start: 2017-05-30 | End: 2017-05-30 | Stop reason: HOSPADM

## 2017-05-30 RX ORDER — SODIUM CHLORIDE, SODIUM LACTATE, POTASSIUM CHLORIDE, CALCIUM CHLORIDE 600; 310; 30; 20 MG/100ML; MG/100ML; MG/100ML; MG/100ML
100 INJECTION, SOLUTION INTRAVENOUS CONTINUOUS
Status: DISCONTINUED | OUTPATIENT
Start: 2017-05-30 | End: 2017-05-30 | Stop reason: HOSPADM

## 2017-05-30 RX ORDER — BUPIVACAINE HYDROCHLORIDE 5 MG/ML
30 INJECTION, SOLUTION EPIDURAL; INTRACAUDAL ONCE
Status: COMPLETED | OUTPATIENT
Start: 2017-05-30 | End: 2017-05-30

## 2017-05-30 RX ORDER — FENTANYL CITRATE 50 UG/ML
INJECTION, SOLUTION INTRAMUSCULAR; INTRAVENOUS AS NEEDED
Status: DISCONTINUED | OUTPATIENT
Start: 2017-05-30 | End: 2017-05-30 | Stop reason: HOSPADM

## 2017-05-30 RX ORDER — FACIAL-BODY WIPES
10 EACH TOPICAL DAILY
Status: DISCONTINUED | OUTPATIENT
Start: 2017-05-31 | End: 2017-05-31 | Stop reason: HOSPADM

## 2017-05-30 RX ORDER — HYDROMORPHONE HYDROCHLORIDE 1 MG/ML
1 INJECTION, SOLUTION INTRAMUSCULAR; INTRAVENOUS; SUBCUTANEOUS
Status: DISCONTINUED | OUTPATIENT
Start: 2017-05-30 | End: 2017-05-31 | Stop reason: HOSPADM

## 2017-05-30 RX ORDER — CEFAZOLIN SODIUM IN 0.9 % NACL 2 G/50 ML
2 INTRAVENOUS SOLUTION, PIGGYBACK (ML) INTRAVENOUS ONCE
Status: COMPLETED | OUTPATIENT
Start: 2017-05-30 | End: 2017-05-30

## 2017-05-30 RX ORDER — GLYCOPYRROLATE 0.2 MG/ML
INJECTION INTRAMUSCULAR; INTRAVENOUS AS NEEDED
Status: DISCONTINUED | OUTPATIENT
Start: 2017-05-30 | End: 2017-05-30 | Stop reason: HOSPADM

## 2017-05-30 RX ORDER — FENTANYL CITRATE 50 UG/ML
25 INJECTION, SOLUTION INTRAMUSCULAR; INTRAVENOUS
Status: DISCONTINUED | OUTPATIENT
Start: 2017-05-30 | End: 2017-05-30 | Stop reason: HOSPADM

## 2017-05-30 RX ORDER — MORPHINE SULFATE 10 MG/ML
2 INJECTION, SOLUTION INTRAMUSCULAR; INTRAVENOUS
Status: DISCONTINUED | OUTPATIENT
Start: 2017-05-30 | End: 2017-05-30 | Stop reason: HOSPADM

## 2017-05-30 RX ORDER — ONDANSETRON 2 MG/ML
INJECTION INTRAMUSCULAR; INTRAVENOUS AS NEEDED
Status: DISCONTINUED | OUTPATIENT
Start: 2017-05-30 | End: 2017-05-30 | Stop reason: HOSPADM

## 2017-05-30 RX ORDER — ONDANSETRON 2 MG/ML
4 INJECTION INTRAMUSCULAR; INTRAVENOUS
Status: DISCONTINUED | OUTPATIENT
Start: 2017-05-30 | End: 2017-05-31 | Stop reason: HOSPADM

## 2017-05-30 RX ORDER — SODIUM CHLORIDE 0.9 % (FLUSH) 0.9 %
5-10 SYRINGE (ML) INJECTION AS NEEDED
Status: DISCONTINUED | OUTPATIENT
Start: 2017-05-30 | End: 2017-05-31 | Stop reason: HOSPADM

## 2017-05-30 RX ORDER — KETOROLAC TROMETHAMINE 30 MG/ML
INJECTION, SOLUTION INTRAMUSCULAR; INTRAVENOUS AS NEEDED
Status: DISCONTINUED | OUTPATIENT
Start: 2017-05-30 | End: 2017-05-30 | Stop reason: HOSPADM

## 2017-05-30 RX ORDER — NALOXONE HYDROCHLORIDE 0.4 MG/ML
0.4 INJECTION, SOLUTION INTRAMUSCULAR; INTRAVENOUS; SUBCUTANEOUS AS NEEDED
Status: DISCONTINUED | OUTPATIENT
Start: 2017-05-30 | End: 2017-05-31 | Stop reason: HOSPADM

## 2017-05-30 RX ORDER — DEXAMETHASONE SODIUM PHOSPHATE 4 MG/ML
INJECTION, SOLUTION INTRA-ARTICULAR; INTRALESIONAL; INTRAMUSCULAR; INTRAVENOUS; SOFT TISSUE AS NEEDED
Status: DISCONTINUED | OUTPATIENT
Start: 2017-05-30 | End: 2017-05-30 | Stop reason: HOSPADM

## 2017-05-30 RX ORDER — DIPHENHYDRAMINE HYDROCHLORIDE 50 MG/ML
12.5 INJECTION, SOLUTION INTRAMUSCULAR; INTRAVENOUS AS NEEDED
Status: DISCONTINUED | OUTPATIENT
Start: 2017-05-30 | End: 2017-05-30 | Stop reason: HOSPADM

## 2017-05-30 RX ORDER — METRONIDAZOLE 500 MG/100ML
500 INJECTION, SOLUTION INTRAVENOUS ONCE
Status: DISCONTINUED | OUTPATIENT
Start: 2017-05-30 | End: 2017-05-30 | Stop reason: HOSPADM

## 2017-05-30 RX ADMIN — SUCCINYLCHOLINE CHLORIDE 140 MG: 20 INJECTION INTRAMUSCULAR; INTRAVENOUS at 10:45

## 2017-05-30 RX ADMIN — MIDAZOLAM HYDROCHLORIDE 2 MG: 1 INJECTION, SOLUTION INTRAMUSCULAR; INTRAVENOUS at 10:41

## 2017-05-30 RX ADMIN — GLYCOPYRROLATE 0.5 MG: 0.2 INJECTION INTRAMUSCULAR; INTRAVENOUS at 11:32

## 2017-05-30 RX ADMIN — DEXAMETHASONE SODIUM PHOSPHATE 4 MG: 4 INJECTION, SOLUTION INTRA-ARTICULAR; INTRALESIONAL; INTRAMUSCULAR; INTRAVENOUS; SOFT TISSUE at 10:50

## 2017-05-30 RX ADMIN — SODIUM CHLORIDE, SODIUM LACTATE, POTASSIUM CHLORIDE, AND CALCIUM CHLORIDE 100 ML/HR: 600; 310; 30; 20 INJECTION, SOLUTION INTRAVENOUS at 10:32

## 2017-05-30 RX ADMIN — Medication 10 ML: at 21:30

## 2017-05-30 RX ADMIN — LIDOCAINE HYDROCHLORIDE 80 MG: 20 INJECTION, SOLUTION EPIDURAL; INFILTRATION; INTRACAUDAL; PERINEURAL at 10:44

## 2017-05-30 RX ADMIN — HYDROMORPHONE HYDROCHLORIDE 1 MG: 1 INJECTION, SOLUTION INTRAMUSCULAR; INTRAVENOUS; SUBCUTANEOUS at 21:29

## 2017-05-30 RX ADMIN — HYDROMORPHONE HYDROCHLORIDE 0.5 MG: 1 INJECTION, SOLUTION INTRAMUSCULAR; INTRAVENOUS; SUBCUTANEOUS at 11:21

## 2017-05-30 RX ADMIN — FENTANYL CITRATE 50 MCG: 50 INJECTION, SOLUTION INTRAMUSCULAR; INTRAVENOUS at 11:34

## 2017-05-30 RX ADMIN — ROCURONIUM BROMIDE 10 MG: 10 INJECTION, SOLUTION INTRAVENOUS at 10:44

## 2017-05-30 RX ADMIN — PROPOFOL 160 MG: 10 INJECTION, EMULSION INTRAVENOUS at 10:44

## 2017-05-30 RX ADMIN — PROPOFOL 40 MG: 10 INJECTION, EMULSION INTRAVENOUS at 10:54

## 2017-05-30 RX ADMIN — NEOSTIGMINE METHYLSULFATE 3 MG: 1 INJECTION INTRAVENOUS at 11:32

## 2017-05-30 RX ADMIN — ENOXAPARIN SODIUM 40 MG: 40 INJECTION SUBCUTANEOUS at 10:36

## 2017-05-30 RX ADMIN — HYDROMORPHONE HYDROCHLORIDE 1 MG: 1 INJECTION, SOLUTION INTRAMUSCULAR; INTRAVENOUS; SUBCUTANEOUS at 16:58

## 2017-05-30 RX ADMIN — ROCURONIUM BROMIDE 10 MG: 10 INJECTION, SOLUTION INTRAVENOUS at 10:55

## 2017-05-30 RX ADMIN — Medication 10 ML: at 16:00

## 2017-05-30 RX ADMIN — KETOROLAC TROMETHAMINE 30 MG: 30 INJECTION, SOLUTION INTRAMUSCULAR; INTRAVENOUS at 11:29

## 2017-05-30 RX ADMIN — FENTANYL CITRATE 50 MCG: 50 INJECTION, SOLUTION INTRAMUSCULAR; INTRAVENOUS at 10:44

## 2017-05-30 RX ADMIN — FENTANYL CITRATE 50 MCG: 50 INJECTION, SOLUTION INTRAMUSCULAR; INTRAVENOUS at 10:41

## 2017-05-30 RX ADMIN — ROCURONIUM BROMIDE 10 MG: 10 INJECTION, SOLUTION INTRAVENOUS at 11:13

## 2017-05-30 RX ADMIN — CEFAZOLIN 2 G: 1 INJECTION, POWDER, FOR SOLUTION INTRAMUSCULAR; INTRAVENOUS; PARENTERAL at 10:45

## 2017-05-30 RX ADMIN — ONDANSETRON 4 MG: 2 INJECTION INTRAMUSCULAR; INTRAVENOUS at 11:28

## 2017-05-30 NOTE — PERIOP NOTES
16 fr white with 10 ml balloon inserted by Dr Shivani Emerson at  the start of the procedure. Draining clear yellow urine.

## 2017-05-30 NOTE — PERIOP NOTES
4 ml of Tisseel Fibrin Sealant and Inter used during the procedurel  Lot# [id number] Ramin Galan and #[id number] Allyson Hoang

## 2017-05-30 NOTE — IP AVS SNAPSHOT
[address] [facility] [address] [facility] 
[phone] Patient: Sydnee Messina MRN: [id number] :1970 You are allergic to the following No active allergies Recent Documentation Height Weight Breastfeeding? BMI OB Status Smoking Status 1.575 m 60.3 kg No 24.33 kg/m2 Unknown Current Every Day Smoker Emergency Contacts Name Discharge Info Relation Home Work Mobile Florence Maldonado DISCHARGE CAREGIVER [3] Friend [5] [phone] About your hospitalization You were admitted on:  May 30, 2017 You last received care in the:  [address] You were discharged on:  May 31, 2017 Unit phone number:  [phone] Why you were hospitalized Your primary diagnosis was:  Not on File Providers Seen During Your Hospitalizations Provider Role Specialty Primary office phone David Pena MD Attending Provider Obstetrics & Gynecology [phone] Your Primary Care Physician (PCP) Primary Care Physician Office Phone Office Fax Amber Sherman [phone] Follow-up Information Follow up With Details Comments Contact Info Angelica Us MD   [facility] [address] [facility] 
[phone] Your Appointments 2017  8:45 AM EDT ROUTINE CARE with Angelica Us, [place] [address] [place] [address],[address] Floor [facility] [address] [facility]  
[phone] Current Discharge Medication List  
  
START taking these medications Dose & Instructions Dispensing Information Comments Morning Noon Evening Bedtime HYDROcodone-acetaminophen  mg tablet Commonly known as:  Henrico Hurt Your last dose was: Your next dose is:    
   
   
 Dose:  1 Tab Take 1 Tab by mouth every six (6) hours as needed. Max Daily Amount: 4 Tabs. Quantity:  30 Tab Refills:  0 CONTINUE these medications which have NOT CHANGED Dose & Instructions Dispensing Information Comments Morning Noon Evening Bedtime ADVIL 200 mg tablet Generic drug:  ibuprofen Your last dose was: Your next dose is:    
   
   
 Dose:  400 mg Take 400 mg by mouth every six (6) hours as needed for Pain. Refills:  0 ASK your doctor about these medications Dose & Instructions Dispensing Information Comments Morning Noon Evening Bedtime  
 escitalopram oxalate 10 mg tablet Commonly known as:  Vernestine Fiddler Your last dose was: Your next dose is:    
   
   
 Dose:  10 mg Take 1 Tab by mouth daily. Quantity:  30 Tab Refills:  3  
     
   
   
   
  
 levothyroxine 150 mcg tablet Commonly known as:  SYNTHROID Your last dose was: Your next dose is:    
   
   
 Dose:  150 mcg Take 150 mcg by mouth Daily (before breakfast). Refills:  0  
     
   
   
   
  
 liothyronine 25 mcg tablet Commonly known as:  CYTOMEL Your last dose was: Your next dose is:    
   
   
 Dose:  25 mcg Take 1 Tab by mouth daily. Quantity:  90 Tab Refills:  3 Where to Get Your Medications Information on where to get these meds will be given to you by the nurse or doctor. ! Ask your nurse or doctor about these medications HYDROcodone-acetaminophen  mg tablet Discharge Instructions None Discharge Orders None Introducing [facility] SERVICES! Dear [place]: 
Thank you for requesting a [employer] account. Our records indicate that you already have an active [employer] account. You can access your account anytime at [email or web address] Did you know that you can access your hospital and ER discharge instructions at any time in MyChart? You can also review all of your test results from your hospital stay or ER visit. Additional Information If you have questions, please visit the Frequently Asked Questions section of the [employer] website at [email or web address] Remember, MyChart is NOT to be used for urgent needs. For medical emergencies, dial 911. Now available from your iPhone and Android! General Information Please provide this summary of care documentation to your next provider. Patient Signature:  ____________________________________________________________ Date:  ____________________________________________________________  
  
[facility] Provider Signature:  ____________________________________________________________ Date:  ____________________________________________________________

## 2017-05-30 NOTE — OP NOTES
[address]   [address] [place] [address], [address]   OP NOTE       Name:  Whitney Sheffield   MR#:  [id number]   :  1970   Account #:  [de-identified]    Surgery Date:  2017   Date of Adm:  2017       SURGEON: Maxi Machado MD.    ASSISTANT: Leonel Kelsey MD.    ANESTHESIA: General.    PREOPERATIVE DIAGNOSES:   1. Fibroid uterus. 2. Left ovarian cyst.   3. Anemia. 4. Menorrhagia. POSTOPERATIVE DIAGNOSES:   1. Fibroid uterus. 2. Left ovarian cyst.   3. Anemia. 4. Menorrhagia. PROCEDURE: da Lázaro total hysterectomy with bilateral   salpingectomy. PATHOLOGY: Uterus, cervix. FINDINGS: Normal size uterus approximately 10 weeks in size,   cystic fallopian tubes. DRAINS: Polk catheter with clear urine. COMPLICATIONS: None. ESTIMATED BLOOD LOSS:       DESCRIPTION OF OPERATIVE PROCEDURE: After extensive   counseling, risks and benefits of the procedure, complication rates of   the procedure, alternatives to the procedure, as well as consent being   signed, she was taken to the operating room. After adequate   anesthesia, she was placed in the dorsal lithotomy position, prepped   and draped in the usual sterile manner. Polk catheter was placed,   clear urine was noted. Sterile Graves speculum was inserted in the   vagina. Anterior lip of the cervix was grasped with a single-tooth   tenaculum. Cervix was gently dilated to accept a Connoshoer standard   uterine manipulator. Single-tooth tenaculum and Graves speculum   were removed and attention was turned to the abdomen where a   Veress needle was placed infraumbilically, confirmed with a water drop   test. Insufflation of CO2 up to 15 mmHg was then performed without   complication. A #11 scalpel blade was used to make an infraumbilical   incision. An 8-mm bladeless trocar and sleeve was inserted with the   above-noted findings.  Ten cm bilateral to the umbilical port site, 8-mm   bladeless trocars were inserted as well as one in the right lower   quadrant. The patient was placed in steep Trendelenburg. The [employer] robot was then docked under the proper docking technique,   fenestrated bipolar left hand, hot christi in the right hand. Under   careful inspection, bilateral tubo-ovarian ligaments were coagulated   with the fenestrated bipolar, transected with the hot christi. Bilateral   round ligaments coagulated with the fenestrated bipolar, transected   with the hot christi. Bladder flap was created using the hot christi. Bilateral uterine vessels were then coagulated and the uterus was   turned to a blanching pale color. From a posterior to anterior approach,   a colpotomy was performed and the cervix, uterus were delivered   through the vagina. Upon completion, bilateral salpingectomy was   performed using fenestrated bipolar and hot christi without   complication. The vaginal cuff was then closed with 2-0 Vicryl Stratafix   suture in a running nonlocking fashion. Upon complete cuff closure, no   active bleeding noted. Tisseel was sprayed over the vaginal cuff as   well as Natalya. The [employer] robot was then undocked under   the proper undocking technique. The 10/12 sites were closed with 0   Vicryl interrupted x2, 8-mm sites 3-0 Vicryl interrupted x1. Dermabond   was used on the skin. Marcaine 0.25% without was injected   subcuticularly total of 10 mL. The patient tolerated the procedure well. Needle, sponge, and instrument counts were correct x2 at the end of   the procedure. She was awakened in the operating room and taken to   recovery in stable condition.         MD MARJORIE Solo / Sam.Mock   D:  05/30/2017   14:26   T:  05/30/2017   15:04   Job #:  [id number]

## 2017-05-30 NOTE — ANESTHESIA PREPROCEDURE EVALUATION
Anesthetic History   No history of anesthetic complications            Review of Systems / Medical History  Patient summary reviewed, nursing notes reviewed and pertinent labs reviewed    Pulmonary  Within defined limits                 Neuro/Psych   Within defined limits           Cardiovascular  Within defined limits                     GI/Hepatic/Renal  Within defined limits              Endo/Other      Hypothyroidism       Other Findings              Physical Exam    Airway  Mallampati: II  TM Distance: > 6 cm  Neck ROM: normal range of motion   Mouth opening: Normal     Cardiovascular  Regular rate and rhythm,  S1 and S2 normal,  no murmur, click, rub, or gallop             Dental  No notable dental hx       Pulmonary  Breath sounds clear to auscultation               Abdominal  GI exam deferred       Other Findings            Anesthetic Plan    ASA: 2  Anesthesia type: general          Induction: Intravenous  Anesthetic plan and risks discussed with: Patient

## 2017-05-30 NOTE — ANESTHESIA POSTPROCEDURE EVALUATION
Post-Anesthesia Evaluation and Assessment    Patient: Samantha Blood MRN: [id number]  SSN: [id number]    YOB: 1970  Age: 48 y.o. Sex: female       Cardiovascular Function/Vital Signs  Visit Vitals    /71    Pulse 75    Temp 36.7 °C (98.1 °F)    Resp 17    Ht 5' 2\" (1.575 m)    Wt 60.3 kg (133 lb)    SpO2 99%    BMI 24.33 kg/m2       Patient is status post general anesthesia for Procedure(s):  Robotic Assistied Laparoscopic Total Hysterectomy and Bilateral Salpingectomy. Nausea/Vomiting: None    Postoperative hydration reviewed and adequate. Pain:  Pain Scale 1: Numeric (0 - 10) (05/30/17 1231)  Pain Intensity 1: 0 (05/30/17 1231)   Managed    Neurological Status:   Neuro (WDL): Within Defined Limits (05/30/17 1152)  Neuro  Neurologic State: Alert; Appropriate for age (05/30/17 1152)  Orientation Level: Oriented X4 (05/30/17 1152)  Cognition: Follows commands (05/30/17 1152)  Speech: Appropriate for age;Clear (05/30/17 1152)  LUE Motor Response: Purposeful (05/30/17 1152)  LLE Motor Response: Purposeful (05/30/17 1152)  RUE Motor Response: Purposeful (05/30/17 1152)  RLE Motor Response: Purposeful (05/30/17 1152)   At baseline    Mental Status and Level of Consciousness: Arousable    Pulmonary Status:   O2 Device: Nasal cannula (05/30/17 1152)   Adequate oxygenation and airway patent    Complications related to anesthesia: None    Post-anesthesia assessment completed.  No concerns    Signed By: Ashly Mazariegos MD     May 30, 2017

## 2017-05-30 NOTE — IP AVS SNAPSHOT
Current Discharge Medication List  
  
START taking these medications Dose & Instructions Dispensing Information Comments Morning Noon Evening Bedtime HYDROcodone-acetaminophen  mg tablet Commonly known as:  Shakira Jimenez Your last dose was: Your next dose is:    
   
   
 Dose:  1 Tab Take 1 Tab by mouth every six (6) hours as needed. Max Daily Amount: 4 Tabs. Quantity:  30 Tab Refills:  0 CONTINUE these medications which have NOT CHANGED Dose & Instructions Dispensing Information Comments Morning Noon Evening Bedtime ADVIL 200 mg tablet Generic drug:  ibuprofen Your last dose was: Your next dose is:    
   
   
 Dose:  400 mg Take 400 mg by mouth every six (6) hours as needed for Pain. Refills:  0 ASK your doctor about these medications Dose & Instructions Dispensing Information Comments Morning Noon Evening Bedtime  
 escitalopram oxalate 10 mg tablet Commonly known as:  Jason Sauer Your last dose was: Your next dose is:    
   
   
 Dose:  10 mg Take 1 Tab by mouth daily. Quantity:  30 Tab Refills:  3  
     
   
   
   
  
 levothyroxine 150 mcg tablet Commonly known as:  SYNTHROID Your last dose was: Your next dose is:    
   
   
 Dose:  150 mcg Take 150 mcg by mouth Daily (before breakfast). Refills:  0  
     
   
   
   
  
 liothyronine 25 mcg tablet Commonly known as:  CYTOMEL Your last dose was: Your next dose is:    
   
   
 Dose:  25 mcg Take 1 Tab by mouth daily. Quantity:  90 Tab Refills:  3 Where to Get Your Medications Information on where to get these meds will be given to you by the nurse or doctor. ! Ask your nurse or doctor about these medications HYDROcodone-acetaminophen  mg tablet

## 2017-05-30 NOTE — PERIOP NOTES
[facility] REPORT WAS CALLED  N Keny Figueroa RN ON [address] NOTIFIED. FAMILY WAS AT BEDSIDE. SHE WENT UP WITH A BAG OF CLOTHES AND DENTURES IN HER MOUTH.

## 2017-05-30 NOTE — PERIOP NOTES
Patient: Aniya Welsh MRN: [id number]  SSN: [id number]   YOB: 1970  Age: 48 y.o. Sex: female     Patient is status post Procedure(s):  Robotic Assistied Laparoscopic Total Hysterectomy and Bilateral Salpingectomy. Surgeon(s) and Role:      * George Irizarry MD - Primary     * Magda Dooley MD    Local/Dose/Irrigation: See [facility]                  Peripheral IV 08/05/12 Left Antecubital (Active)       Peripheral IV 05/30/17 Left Wrist (Active)                           Dressing/Packing:  Wound Abdomen-DRESSING TYPE: Topical skin adhesive/glue (Dermabond on the 4 trocar sites ) (05/30/17 1100)  Splint/Cast:  ]    Other:

## 2017-05-30 NOTE — H&P
Gynecology History and Physical    Name: Nima Bay MRN: [id number] SSN: [id number]    YOB: 1970  Age: 48 y.o. Sex: female       Subjective:      Chief complaint:  Pelvic pain    [place] is a 48 y.o.  female with a history of fibroids. Previous workup included Ultrasound which revealed fibroid(s) and an endometrial biopsy which was benign. Previous treatment measures included hormone therapy, nonsteroidal anti-inflammatory drugs (NSAIDs) and observation. She is admitted for Procedure(s) (LRB):  ROBOTIC ASSISTED LAPARAOSCOPIC TOTAL HYSTERECTOMY / BSO  (N/A). The current method of family planning is none. OB History     No data available        Past Medical History:   Diagnosis Date    Anemia     Depression     Graves disease     Hepatitis A age 10    treated    History of blood transfusion 04/27/2017    2 units PRBC    Menorrhagia      Past Surgical History:   Procedure Laterality Date    HX COLONOSCOPY      HX DILATION AND CURETTAGE  05/18/2017     Social History     Occupational History    Not on file. Social History Main Topics    Smoking status: Current Every Day Smoker     Packs/day: 1.00     Years: 30.00    Smokeless tobacco: Never Used    Alcohol use Yes      Comment: QUIT 5/15/2017-STATES THERE WAS A PROBLEM    Drug use: No    Sexual activity: Yes     Partners: Male     Family History   Problem Relation Age of Onset    Cancer Mother      thyroid cancer and depression    Psychiatric Disorder Mother     Alcohol abuse Mother     Alcohol abuse Father     Diabetes Brother     Heart Disease Brother         No Known Allergies  Prior to Admission medications    Medication Sig Start Date End Date Taking? Authorizing Provider   levothyroxine (SYNTHROID) 150 mcg tablet Take 150 mcg by mouth Daily (before breakfast). Historical Provider   ibuprofen (ADVIL) 200 mg tablet Take 400 mg by mouth every six (6) hours as needed for Pain.     Historical Provider escitalopram oxalate (LEXAPRO) 10 mg tablet Take 1 Tab by mouth daily. Patient taking differently: Take 10 mg by mouth nightly. 4/18/17   Mario Nicholas MD   liothyronine (CYTOMEL) 25 mcg tablet Take 1 Tab by mouth daily. 2/16/17   Mario Nicholas MD        Review of Systems:  A comprehensive review of systems was negative except for that written in the History of Present Illness. Objective: There were no vitals filed for this visit. Physical Exam:  Patient without distress. Heart: Regular rate and rhythm or S1S2 present  Lung: clear to auscultation throughout lung fields, no wheezes, no rales, no rhonchi and normal respiratory effort  Abdomen: soft, nontender    Assessment:     Active Problems:    * No active hospital problems. *     Anemia, Fibroids and Pelvic pain with menorrhagia    Plan:     Procedure(s) (LRB):  ROBOTIC ASSISTED LAPARAOSCOPIC TOTAL HYSTERECTOMY / BSO  (N/A)  Discussed the risks of surgery including the risks of bleeding, infection, deep vein thrombosis, and surgical injuries to internal organs including but not limited to the bowels, bladder, rectum, and female reproductive organs. The patient understands the risks; any and all questions were answered to the patient's satisfaction.     Signed By:  Da Montez MD     May 30, 2017

## 2017-05-31 VITALS
BODY MASS INDEX: 24.48 KG/M2 | RESPIRATION RATE: 18 BRPM | DIASTOLIC BLOOD PRESSURE: 74 MMHG | OXYGEN SATURATION: 99 % | HEIGHT: 62 IN | HEART RATE: 77 BPM | TEMPERATURE: 98.3 F | WEIGHT: 133 LBS | SYSTOLIC BLOOD PRESSURE: 126 MMHG

## 2017-05-31 LAB
BASOPHILS # BLD AUTO: 0 K/UL (ref 0–0.1)
BASOPHILS # BLD: 0 % (ref 0–1)
DIFFERENTIAL METHOD BLD: ABNORMAL
EOSINOPHIL # BLD: 0 K/UL (ref 0–0.4)
EOSINOPHIL NFR BLD: 0 % (ref 0–7)
ERYTHROCYTE [DISTWIDTH] IN BLOOD BY AUTOMATED COUNT: 24.6 % (ref 11.5–14.5)
HCT VFR BLD AUTO: 26.4 % (ref 35–47)
HGB BLD-MCNC: 7.8 G/DL (ref 11.5–16)
LYMPHOCYTES # BLD AUTO: 17 % (ref 12–49)
LYMPHOCYTES # BLD: 1.8 K/UL (ref 0.8–3.5)
MCH RBC QN AUTO: 21.2 PG (ref 26–34)
MCHC RBC AUTO-ENTMCNC: 29.5 G/DL (ref 30–36.5)
MCV RBC AUTO: 71.7 FL (ref 80–99)
MONOCYTES # BLD: 1.1 K/UL (ref 0–1)
MONOCYTES NFR BLD AUTO: 10 % (ref 5–13)
NEUTS SEG # BLD: 7.9 K/UL (ref 1.8–8)
NEUTS SEG NFR BLD AUTO: 73 % (ref 32–75)
PLATELET # BLD AUTO: 268 K/UL (ref 150–400)
RBC # BLD AUTO: 3.68 M/UL (ref 3.8–5.2)
RBC MORPH BLD: ABNORMAL
WBC # BLD AUTO: 10.8 K/UL (ref 3.6–11)

## 2017-05-31 PROCEDURE — 85025 COMPLETE CBC W/AUTO DIFF WBC: CPT | Performed by: SPECIALIST

## 2017-05-31 PROCEDURE — 99218 HC RM OBSERVATION: CPT

## 2017-05-31 PROCEDURE — 36415 COLL VENOUS BLD VENIPUNCTURE: CPT | Performed by: SPECIALIST

## 2017-05-31 PROCEDURE — 74011250637 HC RX REV CODE- 250/637: Performed by: SPECIALIST

## 2017-05-31 RX ORDER — HYDROCODONE BITARTRATE AND ACETAMINOPHEN 10; 325 MG/1; MG/1
1 TABLET ORAL
Qty: 30 TAB | Refills: 0 | Status: SHIPPED | OUTPATIENT
Start: 2017-05-31 | End: 2017-12-20 | Stop reason: ALTCHOICE

## 2017-05-31 RX ADMIN — HYDROCODONE BITARTRATE AND ACETAMINOPHEN 1 TABLET: 10; 325 TABLET ORAL at 08:43

## 2017-05-31 NOTE — PROGRESS NOTES
Gynecology Progress Note    Froilan Gomez    She is without significant complaints. Pain controlled on current medication. Voiding without difficulty. Patient is passing flatus. She is is tolerating her diet. Vitals:  Temp (24hrs), Av.4 °F (36.9 °C), Min:98 °F (36.7 °C), Max:99.4 °F (37.4 °C)      Visit Vitals    /81 (BP 1 Location: Right arm, BP Patient Position: At rest)    Pulse 77    Temp 98.5 °F (36.9 °C)    Resp 16    Ht 5' 2\" (1.575 m)    Wt 60.3 kg (133 lb)    SpO2 97%    Breastfeeding No    BMI 24.33 kg/m2        Intake and Output:   Current shift: 1901 -  0700  In: 2359 [P.O.:1125; I.V.:1234]  Out: 3060 [Urine:3060]  Last 3 completed shifts: 701 - 1900  In: 0077 [P.O.:640; I.V.:650]  Out: 150 [Urine:140]      Exam:  General: alert, cooperative, no distress, appears stated age     Lung: clear to auscultation bilaterally     Heart: regular rate and rhythm, S1, S2 normal, no murmur, click, rub or gallop     Abdomen: incision c/d/i     Extremities: extremities normal, atraumatic, no cyanosis or edema, no edema      Labs:   Lab Results   Component Value Date/Time    WBC 7.9 2017 08:23 AM    WBC 8.7 2017 02:56 PM    WBC 8.2 2017 02:16 PM    WBC 8.9 2017 11:11 AM    WBC 8.8 2012 11:45 PM    HGB 9.2 2017 08:23 AM    HGB 9.1 2017 02:56 PM    HGB 6.9 2017 02:16 PM    HGB 7.9 2017 11:11 AM    HGB 9.3 2012 11:45 PM    HCT 31.3 2017 08:23 AM    HCT 30.4 2017 02:56 PM    HCT 25.3 2017 02:16 PM    HCT 28.5 2017 11:11 AM    HCT 30.4 2012 11:45 PM    PLATELET 289  08:23 AM    PLATELET 717  02:56 PM    PLATELET 512  02:16 PM    PLATELET 712 55/10/8158 11:11 AM    PLATELET 934  11:45 PM       No results found for this or any previous visit (from the past 24 hour(s)). Assesment: POD#1  da Lázaro total hysterectomy with bilateral   salpingectomy. Doing well  Plan: Discharge home today with: Medications: medications prior to admission Follow up: 2 weeks Diet: as tolerated Activity: no heavy lifting

## 2017-05-31 NOTE — PROGRESS NOTES
Bedside shift change report given to Alicia Whitlock RN  (oncoming nurse) by aBljit Yusuf RN  (offgoing nurse). Report included the following information SBAR and MAR.

## 2017-06-08 NOTE — ADT AUTH CERT NOTES
Jaye Jean-Baptiste       MRN: [id number]   Note Report   Progress Notes   Progress Notes by Jacobo Roy RN at 17   Author: Jacobo Roy RN Service: (none) Author Type: Registered Nurse   Filed: 17 Date of Service: 17 Status: Signed   : Jacobo Roy RN (Registered Nurse)        Bedside shift change report given to Adrian Ayon RN (oncoming nurse) by Xuan Schultz RN (offgoing nurse). Report included the following information SBAR and MAR.             Progress Notes by Milagros Strong MD at 17 [id number]   Author: Milagros Strong MD Service: Obstetrics & Gynecology Author Type: Physician   Filed: 17 Date of Service: 17 171 Status: Signed   : Milagros Strong MD (Physician)     Expand All Collapse All    Gynecology Progress Note     Leata Blaise     She is without significant complaints. Pain controlled on current medication. Voiding without difficulty. Patient is passing flatus. She is is tolerating her diet.     Vitals: Temp (24hrs), Av.4 °F (36.9 °C), Min:98 °F (36.7 °C), Max:99.4 °F (37.4 °C)             Visit Vitals    /81 (BP 1 Location: Right arm, BP Patient Position: At rest)    Pulse 77    Temp 98.5 °F (36.9 °C)    Resp 16    Ht 5' 2\" (1.575 m)    Wt 60.3 kg (133 lb)    SpO2 97%    Breastfeeding No    BMI 24.33 kg/m2         Intake and Output:   Current shift: 1901 - 700  In: 5924 [P.O.:1125;  I.V.:1234]  Out: 3060 [Urine:3060]  Last 3 completed shifts: 701 - 1900  In: 5859 [P.O.:640; I.V.:650]  Out: 150 [Urine:140]        Exam: General: alert, cooperative, no distress, appears stated age  Lung: clear to auscultation bilaterally  Heart: regular rate and rhythm, S1, S2 normal, no murmur, click, rub or gallop  Abdomen: incision c/d/i  Extremities: extremities normal, atraumatic, no cyanosis or edema, no edema        Labs:         Lab Results   Component Value Date/Time     WBC 7.9 2017 08:23 AM     WBC 8.7 05/16/2017 02:56 PM     WBC 8.2 04/18/2017 02:16 PM     WBC 8.9 02/16/2017 11:11 AM     WBC 8.8 08/04/2012 11:45 PM     HGB 9.2 05/26/2017 08:23 AM     HGB 9.1 05/16/2017 02:56 PM     HGB 6.9 04/18/2017 02:16 PM     HGB 7.9 02/16/2017 11:11 AM     HGB 9.3 08/04/2012 11:45 PM     HCT 31.3 05/26/2017 08:23 AM     HCT 30.4 05/16/2017 02:56 PM     HCT 25.3 04/18/2017 02:16 PM     HCT 28.5 02/16/2017 11:11 AM     HCT 30.4 08/04/2012 11:45 PM     PLATELET 333 29/29/3974 08:23 AM     PLATELET 072 78/91/2320 02:56 PM     PLATELET 677 12/64/9969 02:16 PM     PLATELET 511 05/81/5115 11:11 AM     PLATELET 943 97/22/3888 11:45 PM          Recent Results    No results found for this or any previous visit (from the past 24 hour(s)).        Assesment: POD#1  da Lázaro total hysterectomy with bilateral   salpingectomy. Doing well  Plan: Discharge home today with: Medications: medications prior to admission Follow up: 2 weeks Diet: as tolerated Activity: no heavy lifting            Progress Notes by Brooke Vences RN at 05/31/17 [id number]   Author: Brooke Vences RN Service: (none) Author Type: Registered Nurse   Filed: 05/31/17 0921 Date of Service: 05/31/17 0921 Status: Signed   : Brooke Vences RN (Registered Nurse)        Discharge completed. Vital signs stable. Patient to f/u June with Dr. Negar Carrera.

## 2017-06-20 ENCOUNTER — OFFICE VISIT (OUTPATIENT)
Dept: INTERNAL MEDICINE CLINIC | Age: 47
End: 2017-06-20

## 2017-06-20 VITALS
HEIGHT: 62 IN | WEIGHT: 132.2 LBS | HEART RATE: 108 BPM | TEMPERATURE: 98.4 F | BODY MASS INDEX: 24.33 KG/M2 | SYSTOLIC BLOOD PRESSURE: 142 MMHG | DIASTOLIC BLOOD PRESSURE: 89 MMHG | OXYGEN SATURATION: 97 %

## 2017-06-20 DIAGNOSIS — F32.A DEPRESSION, UNSPECIFIED DEPRESSION TYPE: ICD-10-CM

## 2017-06-20 DIAGNOSIS — E03.9 UNSPECIFIED HYPOTHYROIDISM: Primary | ICD-10-CM

## 2017-06-20 DIAGNOSIS — D50.0 IRON DEFICIENCY ANEMIA DUE TO CHRONIC BLOOD LOSS: ICD-10-CM

## 2017-06-20 RX ORDER — ESCITALOPRAM OXALATE 10 MG/1
10 TABLET ORAL DAILY
Qty: 30 TAB | Refills: 6 | Status: SHIPPED | OUTPATIENT
Start: 2017-06-20 | End: 2017-12-20 | Stop reason: SDUPTHER

## 2017-06-20 RX ORDER — LEVOTHYROXINE SODIUM 125 UG/1
TABLET ORAL
COMMUNITY
End: 2017-06-21

## 2017-06-20 NOTE — MR AVS SNAPSHOT
Visit Information Date & Time Provider Department Dept. Phone Encounter #  
 6/20/2017  8:45 AM Jaspreet De La Cruz, [address] [place] [address],[address] [phone] [id number] Follow-up Instructions Return in about 6 months (around 12/20/2017) for hypothyroid, depression anemia. Upcoming Health Maintenance Date Due Pneumococcal 19-64 Medium Risk (1 of 1 - PPSV23) 12/5/1987 DTaP/Tdap/Td series (1 - Tdap) 12/5/1989 PAP AKA CERVICAL CYTOLOGY 12/5/1989 INFLUENZA AGE 9 TO ADULT 8/1/2017 Allergies as of 6/20/2017  Review Complete On: 6/20/2017 By: Jaspreet De La Cruz MD  
 No Known Allergies Current Immunizations  Reviewed on 4/27/2017 No immunizations on file. Not reviewed this visit You Were Diagnosed With   
  
 Codes Comments Unspecified hypothyroidism    -  Primary ICD-10-CM: E03.9 ICD-9-CM: 244.9 Depression, unspecified depression type     ICD-10-CM: F32.9 ICD-9-CM: 451 Iron deficiency anemia due to chronic blood loss     ICD-10-CM: D50.0 ICD-9-CM: 280.0 Vitals BP Pulse Temp Height(growth percentile) Weight(growth percentile) LMP  
 142/89 (BP 1 Location: Left arm, BP Patient Position: Sitting) (!) 127 98.4 °F (36.9 °C) (Oral) 5' 2\" (1.575 m) 132 lb 3.2 oz (60 kg) 05/22/2017 SpO2 BMI OB Status Smoking Status 97% 24.18 kg/m2 Hysterectomy Current Every Day Smoker BMI and BSA Data Body Mass Index Body Surface Area  
 24.18 kg/m 2 1.62 m 2 Preferred Pharmacy Pharmacy Name Phone [id number] [phone] Your Updated Medication List  
  
   
This list is accurate as of: 6/20/17  9:20 AM.  Always use your most recent med list. ADVIL 200 mg tablet Generic drug:  ibuprofen Take 400 mg by mouth every six (6) hours as needed for Pain.  
  
 escitalopram oxalate 10 mg tablet Commonly known as:  Carlos Marsh Take 1 Tab by mouth daily. HYDROcodone-acetaminophen  mg tablet Commonly known as:  [place] Josue Take 1 Tab by mouth every six (6) hours as needed. Max Daily Amount: 4 Tabs. levothyroxine 125 mcg tablet Commonly known as:  SYNTHROID Take  by mouth Daily (before breakfast). liothyronine 25 mcg tablet Commonly known as:  CYTOMEL Take 1 Tab by mouth daily. Prescriptions Sent to Pharmacy Refills  
 escitalopram oxalate (LEXAPRO) 10 mg tablet 6 Sig: Take 1 Tab by mouth daily. Class: Normal  
 Pharmacy: [id number]- [address]  #: [phone] Route: Oral  
  
We Performed the Following TSH 3RD GENERATION [86200 CPT(R)] Follow-up Instructions Return in about 6 months (around 12/20/2017) for hypothyroid, depression anemia. Introducing [facility] SERVICES! Dear [place]: 
Thank you for requesting a [employer] account. Our records indicate that you already have an active [employer] account. You can access your account anytime at [email or web address] Did you know that you can access your hospital and ER discharge instructions at any time in [employer]? You can also review all of your test results from your hospital stay or ER visit. Additional Information If you have questions, please visit the Frequently Asked Questions section of the [employer] website at [email or web address] Remember, [employer] is NOT to be used for urgent needs. For medical emergencies, dial 911. Now available from your iPhone and Android! Please provide this summary of care documentation to your next provider. Your primary care clinician is listed as Scar NOBLES. If you have any questions after today's visit, please call [phone].

## 2017-06-20 NOTE — PROGRESS NOTES
HISTORY OF Cj Gomes is a 48 y.o. female. HPI   F/u depression, hypothroidism, anemia--s/p total- hyst and b/l salpingectomy--[facility]--Dr Liu  Has healed up and recovered well from surgery, now has night sweats  Needs note to return to work  Taking lexapro for depression , lack of motiviation--feels less stressed  Her last tsh was low so levothyroxine dose was lowered to 125 mcg every day from 150mcg  Stopped alcohol use  Feels much better overall    Patient Active Problem List    Diagnosis Date Noted    Iron deficiency anemia due to chronic blood loss 02/16/2017    Unspecified hypothyroidism 02/11/2013    Smoker 02/11/2013    Depression 02/11/2013    Anxiety 02/11/2013    AK (actinic keratosis) 02/11/2013     Current Outpatient Prescriptions   Medication Sig Dispense Refill    levothyroxine (SYNTHROID) 125 mcg tablet Take  by mouth Daily (before breakfast).  ibuprofen (ADVIL) 200 mg tablet Take 400 mg by mouth every six (6) hours as needed for Pain.  escitalopram oxalate (LEXAPRO) 10 mg tablet Take 1 Tab by mouth daily. (Patient taking differently: Take 10 mg by mouth nightly.) 30 Tab 3    liothyronine (CYTOMEL) 25 mcg tablet Take 1 Tab by mouth daily. 90 Tab 3    HYDROcodone-acetaminophen (NORCO)  mg tablet Take 1 Tab by mouth every six (6) hours as needed. Max Daily Amount: 4 Tabs. 30 Tab 0     No Known Allergies   Lab Results  Component Value Date/Time   WBC 10.8 05/31/2017 06:10 AM   HGB 7.8 05/31/2017 06:10 AM   Hemoglobin (POC) 9.1 05/18/2017 11:02 AM   HCT 26.4 05/31/2017 06:10 AM   PLATELET 998 39/83/7688 06:10 AM   MCV 71.7 05/31/2017 06:10 AM     Lab Results  Component Value Date/Time   TSH 0.036 04/18/2017 02:16 PM   T4, Free 1.06 04/18/2017 02:16 PM         ROS    Physical Exam   Constitutional: She appears well-developed and well-nourished. Appears stated age   Cardiovascular: Normal rate, regular rhythm and normal heart sounds.   Exam reveals no gallop and no friction rub. No murmur heard. Pulmonary/Chest: Effort normal and breath sounds normal. No respiratory distress. She has no wheezes. Abdominal: Soft. Bowel sounds are normal.   Healed lap incisions x3   Musculoskeletal: She exhibits no edema. Neurological: She is alert. Psychiatric: She has a normal mood and affect. Nursing note and vitals reviewed. ASSESSMENT and [address] was seen today for anemia and depression. Diagnoses and all orders for this visit:    Unspecified hypothyroidism  -     TSH 3RD GENERATION   Appears euthyroid clinically    Depression, unspecified depression type   Some improvement in anxiety   Refill lexapro  Iron deficiency anemia due to chronic blood loss   Ok to return to work, work form completed   [address] [facility]    Hot flashes   Try black cohash OTC   Continue SSRI   tolerable  Other orders  -     escitalopram oxalate (LEXAPRO) 10 mg tablet; Take 1 Tab by mouth daily. Follow-up Disposition:  Return in about 6 months (around 12/20/2017) for hypothyroid, depression anemia.

## 2017-06-21 LAB — TSH SERPL DL<=0.005 MIU/L-ACNC: <0.006 UIU/ML (ref 0.45–4.5)

## 2017-06-21 RX ORDER — LEVOTHYROXINE SODIUM 88 UG/1
88 TABLET ORAL
Qty: 30 TAB | Refills: 1 | Status: SHIPPED | OUTPATIENT
Start: 2017-06-21 | End: 2017-10-02 | Stop reason: SDUPTHER

## 2017-06-21 NOTE — PROGRESS NOTES
Tell pt her thyroid level is too high --stop levothyroxine 125 mcg every day-  Will lower dose to 88 mcg every day--repeat tsh in 6 weeks.

## 2017-10-02 DIAGNOSIS — E03.9 HYPOTHYROIDISM, UNSPECIFIED TYPE: Primary | ICD-10-CM

## 2017-10-02 RX ORDER — LEVOTHYROXINE SODIUM 88 UG/1
88 TABLET ORAL
Qty: 30 TAB | Refills: 2 | Status: SHIPPED | OUTPATIENT
Start: 2017-10-02 | End: 2017-10-02 | Stop reason: CLARIF

## 2017-10-02 RX ORDER — LEVOTHYROXINE SODIUM 88 UG/1
88 TABLET ORAL
Qty: 30 TAB | Refills: 2 | Status: SHIPPED | OUTPATIENT
Start: 2017-10-02 | End: 2017-12-28 | Stop reason: SDUPTHER

## 2017-12-20 ENCOUNTER — OFFICE VISIT (OUTPATIENT)
Dept: INTERNAL MEDICINE CLINIC | Age: 47
End: 2017-12-20

## 2017-12-20 VITALS
TEMPERATURE: 98.3 F | WEIGHT: 142.2 LBS | OXYGEN SATURATION: 98 % | DIASTOLIC BLOOD PRESSURE: 84 MMHG | HEART RATE: 95 BPM | SYSTOLIC BLOOD PRESSURE: 135 MMHG | BODY MASS INDEX: 26.17 KG/M2 | HEIGHT: 62 IN

## 2017-12-20 DIAGNOSIS — F32.A DEPRESSION, UNSPECIFIED DEPRESSION TYPE: ICD-10-CM

## 2017-12-20 DIAGNOSIS — D50.0 IRON DEFICIENCY ANEMIA DUE TO CHRONIC BLOOD LOSS: ICD-10-CM

## 2017-12-20 DIAGNOSIS — R19.7 DIARRHEA, UNSPECIFIED TYPE: ICD-10-CM

## 2017-12-20 DIAGNOSIS — E03.9 HYPOTHYROIDISM, UNSPECIFIED TYPE: Primary | ICD-10-CM

## 2017-12-20 RX ORDER — ESCITALOPRAM OXALATE 20 MG/1
20 TABLET ORAL DAILY
Qty: 30 TAB | Refills: 11 | Status: SHIPPED | OUTPATIENT
Start: 2017-12-20 | End: 2018-03-23

## 2017-12-20 NOTE — PROGRESS NOTES
HISTORY OF Cj Gomes is a 50 y.o. female. HPI   F/u depression, hypothroidism, anemia--s/p total- hyst and b/l salpingectomy--[facility]--Dr Liu  Last tsh was too low < 0.006 so levothyroxine dose was lowered from 125 down to 88 cg every day--did not get repeat tsh  Also taking cytomel  Gets synthroid  Weigh up 10 lbs and fatigued now  Has healed up and recovered well from surgery, now has night sweats  Needs note to return to work  Taking lexapro for depression , lack of motiviation--feels depressed and stressed  Stopped alcohol use  C/o diarrhea 4-6 times per day for 3 mos--normal colonoscopy in [id number] [facility] 3 years ago per pt for pain, no recent abx    Patient Active Problem List    Diagnosis Date Noted    Iron deficiency anemia due to chronic blood loss 02/16/2017    Unspecified hypothyroidism 02/11/2013    Smoker 02/11/2013    Depression 02/11/2013    Anxiety 02/11/2013    AK (actinic keratosis) 02/11/2013     Current Outpatient Prescriptions   Medication Sig Dispense Refill    levothyroxine (SYNTHROID) 88 mcg tablet Take 1 Tab by mouth Daily (before breakfast). 30 Tab 2    escitalopram oxalate (LEXAPRO) 10 mg tablet Take 1 Tab by mouth daily. 30 Tab 6    ibuprofen (ADVIL) 200 mg tablet Take 400 mg by mouth every six (6) hours as needed for Pain.  liothyronine (CYTOMEL) 25 mcg tablet Take 1 Tab by mouth daily.  90 Tab 3     No Known Allergies   Lab Results  Component Value Date/Time   Glucose 82 05/26/2017 08:23 AM   Creatinine 0.48 05/26/2017 08:23 AM      No results found for: CHOL, CHOLPOCT, HDL, LDL, LDLC, LDLCPOC, LDLCEXT, TRIGL, TGLPOCT, CHHD, CHHDX  Lab Results  Component Value Date/Time   GFR est non-AA >60 05/26/2017 08:23 AM   GFR est AA >60 05/26/2017 08:23 AM   Creatinine 0.48 05/26/2017 08:23 AM   BUN 6 05/26/2017 08:23 AM   Sodium 140 05/26/2017 08:23 AM   Potassium 5.2 05/26/2017 08:23 AM   Chloride 111 05/26/2017 08:23 AM   CO2 24 05/26/2017 08:23 AM     Lab Results  Component Value Date/Time   TSH <0.006 06/20/2017 09:31 AM   T4, Free 1.06 04/18/2017 02:16 PM         ROS    Physical Exam   Constitutional: She appears well-developed and well-nourished. Appears stated age   Cardiovascular: Normal rate, regular rhythm and normal heart sounds. Exam reveals no gallop and no friction rub. No murmur heard. Pulmonary/Chest: Effort normal and breath sounds normal. No respiratory distress. She has no wheezes. Abdominal: Soft. Bowel sounds are normal.   Musculoskeletal: She exhibits no edema. Neurological: She is alert. Psychiatric: She has a normal mood and affect. Nursing note and vitals reviewed. ASSESSMENT and PLAN  Diagnoses and all orders for this visit:    1. Hypothyroidism, unspecified type  -     TSH 3RD GENERATION  -     T4, FREE   Might need to increase dose to levothyroxine, continue cytomel   Pt decllines referral to endocrine MD  2. Iron deficiency anemia due to chronic blood loss  -     CBC W/O DIFF   S/p hyst    3. Diarrhea, unspecified type  -     CELIAC ANTIBODY PROFILE  -     C DIFFICILE TOXIN A & B BY EIA  -     [facility]   Advised gluten and dairy free diet for now  4. Depression   Not as well controlled   Increase lexapro 20 mg qd  Other orders  -     escitalopram oxalate (LEXAPRO) 20 mg tablet; Take 1 Tab by mouth daily. Follow-up Disposition:  Return in about 3 months (around 3/20/2018) for f/u thyroid.

## 2017-12-20 NOTE — PROGRESS NOTES
Patient is here today for 6 month follow up  Thyroid,depression and anemia.  Patient complaining  of diarrhea x 3 months

## 2017-12-24 LAB
ERYTHROCYTE [DISTWIDTH] IN BLOOD BY AUTOMATED COUNT: 19.1 % (ref 12.3–15.4)
GLIADIN PEPTIDE IGA SER-ACNC: 7 UNITS (ref 0–19)
GLIADIN PEPTIDE IGG SER-ACNC: 2 UNITS (ref 0–19)
HCT VFR BLD AUTO: 42.3 % (ref 34–46.6)
HGB BLD-MCNC: 14 G/DL (ref 11.1–15.9)
IGA SERPL-MCNC: 412 MG/DL (ref 87–352)
MCH RBC QN AUTO: 28.7 PG (ref 26.6–33)
MCHC RBC AUTO-ENTMCNC: 33.1 G/DL (ref 31.5–35.7)
MCV RBC AUTO: 87 FL (ref 79–97)
PLATELET # BLD AUTO: 211 X10E3/UL (ref 150–379)
RBC # BLD AUTO: 4.87 X10E6/UL (ref 3.77–5.28)
T4 FREE SERPL-MCNC: 0.69 NG/DL (ref 0.82–1.77)
TSH SERPL DL<=0.005 MIU/L-ACNC: 0.03 UIU/ML (ref 0.45–4.5)
TTG IGA SER-ACNC: <2 U/ML (ref 0–3)
TTG IGG SER-ACNC: <2 U/ML (ref 0–5)
WBC # BLD AUTO: 7.4 X10E3/UL (ref 3.4–10.8)

## 2017-12-28 DIAGNOSIS — E03.9 HYPOTHYROIDISM, UNSPECIFIED TYPE: Primary | ICD-10-CM

## 2017-12-28 RX ORDER — LEVOTHYROXINE SODIUM 88 UG/1
88 TABLET ORAL
Qty: 30 TAB | Refills: 5 | Status: SHIPPED | OUTPATIENT
Start: 2017-12-28 | End: 2018-03-25

## 2017-12-28 NOTE — PROGRESS NOTES
Discussed labs--dc cytomel.  Continue levothyroxine 88mcg every day--repeat tsh and free t4 in 6 weeks

## 2018-03-02 DIAGNOSIS — E03.9 HYPOTHYROIDISM, UNSPECIFIED TYPE: Primary | ICD-10-CM

## 2018-03-23 ENCOUNTER — OFFICE VISIT (OUTPATIENT)
Dept: INTERNAL MEDICINE CLINIC | Age: 48
End: 2018-03-23

## 2018-03-23 VITALS
DIASTOLIC BLOOD PRESSURE: 88 MMHG | OXYGEN SATURATION: 98 % | HEART RATE: 84 BPM | WEIGHT: 156 LBS | TEMPERATURE: 97.8 F | SYSTOLIC BLOOD PRESSURE: 143 MMHG | HEIGHT: 62 IN | BODY MASS INDEX: 28.71 KG/M2

## 2018-03-23 DIAGNOSIS — R19.7 DIARRHEA, UNSPECIFIED TYPE: ICD-10-CM

## 2018-03-23 DIAGNOSIS — F32.A DEPRESSION, UNSPECIFIED DEPRESSION TYPE: ICD-10-CM

## 2018-03-23 DIAGNOSIS — F10.10 ALCOHOL ABUSE: ICD-10-CM

## 2018-03-23 DIAGNOSIS — Z72.0 TOBACCO ABUSE: ICD-10-CM

## 2018-03-23 DIAGNOSIS — E03.9 HYPOTHYROIDISM, UNSPECIFIED TYPE: ICD-10-CM

## 2018-03-23 DIAGNOSIS — Z78.0 MENOPAUSE: Primary | ICD-10-CM

## 2018-03-23 RX ORDER — BUPROPION HYDROCHLORIDE 150 MG/1
150 TABLET, EXTENDED RELEASE ORAL 2 TIMES DAILY
Qty: 60 TAB | Refills: 3 | Status: SHIPPED | OUTPATIENT
Start: 2018-03-23 | End: 2018-03-30 | Stop reason: SDUPTHER

## 2018-03-23 NOTE — PROGRESS NOTES
HISTORY OF Cj Gomes is a 50 y.o. female. HPI   F/u depression, hypothroidism, anemia--s/p total- hyst and b/l salpingectomy--[facility]--Dr Liu  Last visit tsh was low 0.025 ( previously <0.006 on levothyroxine 125 mcg every day). and free t 4 also low 0.69 on levothyroxine 88 mcg every day  Cytomel 25 mcg every day was stopped due to low tsh  Has gained 14 lbs in last 4 mos  Dieting now to control weight  Diarrhea resolved    Hb 14 s/p hyst  lexapro was increased to 20 mg every day for increased sxs of depression  Feels depressed --no interest on things , sex drive is down. Still able to work full time  Had diarrhea--celiac profile negative, referred to GI ---but sxs resovled now  Smokes 1.5 ppd now-wants to quit, asking about chantix  Increased etoh 3-4 shots per day  Last visit  Last tsh was too low < 0.006 so levothyroxine dose was lowered from 125 down to 88 cg every day--did not get repeat tsh  Also taking cytomel  Gets synthroid  Weigh up 10 lbs and fatigued now  Has healed up and recovered well from surgery, now has night sweats  Needs note to return to work  Taking lexapro for depression , lack of motiviation--feels depressed and stressed  Stopped alcohol use  C/o diarrhea 4-6 times per day for 3 mos--normal colonoscopy in [address] [facility] 3 years ago per pt for pain, no recent abx       Patient Active Problem List    Diagnosis Date Noted    Iron deficiency anemia due to chronic blood loss 02/16/2017    Unspecified hypothyroidism 02/11/2013    Smoker 02/11/2013    Depression 02/11/2013    Anxiety 02/11/2013    AK (actinic keratosis) 02/11/2013     Current Outpatient Prescriptions   Medication Sig Dispense Refill    levothyroxine (SYNTHROID) 88 mcg tablet Take 1 Tab by mouth Daily (before breakfast). 30 Tab 5    escitalopram oxalate (LEXAPRO) 20 mg tablet Take 1 Tab by mouth daily. 30 Tab 11    ibuprofen (ADVIL) 200 mg tablet Take 400 mg by mouth every six (6) hours as needed for Pain. No Known Allergies   No results found for: CHOL, CHOLPOCT, HDL, LDL, LDLC, LDLCPOC, LDLCEXT, TRIGL, TGLPOCT, CHHD, CHHDX  Lab Results  Component Value Date/Time   GFR est non-AA >60 05/26/2017 08:23 AM   GFR est AA >60 05/26/2017 08:23 AM   Creatinine 0.48 (L) 05/26/2017 08:23 AM   BUN 6 05/26/2017 08:23 AM   Sodium 140 05/26/2017 08:23 AM   Potassium 5.2 (H) 05/26/2017 08:23 AM   Chloride 111 (H) 05/26/2017 08:23 AM   CO2 24 05/26/2017 08:23 AM     Lab Results  Component Value Date/Time   TSH 0.025 (L) 12/20/2017 09:47 AM   T4, Free 0.69 (L) 12/20/2017 09:47 AM         ROS    Physical Exam   Constitutional: She appears well-developed and well-nourished. Appears stated age   Cardiovascular: Normal rate, regular rhythm and normal heart sounds. Exam reveals no gallop and no friction rub. No murmur heard. Pulmonary/Chest: Effort normal and breath sounds normal. No respiratory distress. She has no wheezes. Abdominal: Soft. Bowel sounds are normal.   Musculoskeletal: She exhibits no edema. Neurological: She is alert. Psychiatric: She has a normal mood and affect. Nursing note and vitals reviewed. ASSESSMENT and PLAN  Diagnoses and all orders for this visit:    1. Menopause  -     [facility]   Weight gain, hot flashes decreased libido s/p complete hyst last year-? HRT  2. Hypothyroidism, unspecified type  -     TSH 3RD GENERATION  -     T4, FREE   Off cytomel now  3. Depression, unspecified depression type  -     [address] [facility]   Not improved-no si/si   Change to wellbutrin  4. Diarrhea, unspecified type   resolved  5. Tobacco abuse    Taper off lexapro then start zyban  6. Alcohol abuse   counseled on cessation  Other orders  -     buPROPion SR (WELLBUTRIN SR) 150 mg SR tablet; Take 1 Tab by mouth two (2) times a day.       Follow-up Disposition:  Return in about 3 months (around 6/23/2018) for f/u thryodi depression tobacco.

## 2018-03-23 NOTE — PROGRESS NOTES
Chief Complaint   Patient presents with    Thyroid Problem     3 month follow up- need labs    Depression     3 month follow up - still the same -no improvment    Diarrhea     3 month follow up - much better since diet has change    Nicotine Dependence     ready to quit smoking

## 2018-03-23 NOTE — MR AVS SNAPSHOT
[address] [facility] 
[phone] Patient: aGgan Castillo MRN: [id number] :1970 Visit Information Date & Time Provider Department Dept. Phone Encounter #  
 3/23/2018  8:45 AM Jenn Montez, [address] [place] [address],[address] [phone] [id number] Follow-up Instructions Return in about 3 months (around 2018) for f/u thryodi depression tobacco. Upcoming Health Maintenance Date Due Pneumococcal 19-64 Medium Risk (1 of 1 - PPSV23) 1987 DTaP/Tdap/Td series (1 - Tdap) 1989 PAP AKA CERVICAL CYTOLOGY 2020 Allergies as of 3/23/2018  Review Complete On: 3/23/2018 By: Flynn Lawson LPN No Known Allergies Current Immunizations  Reviewed on 2017 No immunizations on file. Not reviewed this visit You Were Diagnosed With   
  
 Codes Comments Menopause    -  Primary ICD-10-CM: Z78.0 ICD-9-CM: 627.2 Hypothyroidism, unspecified type     ICD-10-CM: E03.9 ICD-9-CM: 244.9 Depression, unspecified depression type     ICD-10-CM: F32.9 ICD-9-CM: 317 Diarrhea, unspecified type     ICD-10-CM: R19.7 ICD-9-CM: 787.91 Tobacco abuse     ICD-10-CM: Z72.0 ICD-9-CM: 305.1 Vitals BP Pulse Temp Height(growth percentile) Weight(growth percentile) LMP  
 143/88 (BP 1 Location: Left arm, BP Patient Position: Sitting) 84 97.8 °F (36.6 °C) (Oral) 5' 2\" (1.575 m) 156 lb (70.8 kg) 2017 SpO2 BMI OB Status Smoking Status 98% 28.53 kg/m2 Hysterectomy Current Every Day Smoker BMI and BSA Data Body Mass Index Body Surface Area 28.53 kg/m 2 1.76 m 2 Preferred Pharmacy Pharmacy Name Phone [address] [facility], [place] [address] Vladislav Mason [phone] Your Updated Medication List  
  
   
This list is accurate as of 3/23/18  9:34 AM.  Always use your most recent med list.  
 ADVIL 200 mg tablet Generic drug:  ibuprofen Take 400 mg by mouth every six (6) hours as needed for Pain. buPROPion  mg SR tablet Commonly known as:  Maninder Ani Take 1 Tab by mouth two (2) times a day. levothyroxine 88 mcg tablet Commonly known as:  SYNTHROID Take 1 Tab by mouth Daily (before breakfast). Prescriptions Sent to Pharmacy Refills buPROPion SR (WELLBUTRIN SR) 150 mg SR tablet 3 Sig: Take 1 Tab by mouth two (2) times a day. Class: Normal  
 Pharmacy: [address] [place] [address] #: [phone] Route: Oral  
  
We Performed the Following REFERRAL TO OBSTETRICS AND GYNECOLOGY [REF51 Custom] REFERRAL TO PSYCHIATRY [REF91 Custom] T4, FREE [id number] CPT(R)] TSH 3RD GENERATION [01345 CPT(R)] Follow-up Instructions Return in about 3 months (around 6/23/2018) for f/u thryodi depression tobacco.  
  
  
Referral Information Referral ID Referred By Referred To  
  
 [id number] Lupe Santiago MD   
   [address] Phone: [phone] Fax: [phone] Visits Status Start Date End Date 1 New Request 3/23/18 3/23/19 If your referral has a status of pending review or denied, additional information will be sent to support the outcome of this decision. Referral ID Referred By Referred To  
 [id number] M[place], [address] [place] [address] [facility] Visits Status Start Date End Date 1 New Request 3/23/18 3/23/19 If your referral has a status of pending review or denied, additional information will be sent to support the outcome of this decision. Introducing \[facility]\"" & HEALTH SERVICES! Dear [place]: 
Thank you for requesting a FoKo account.   Our records indicate that you already have an active Maintenance Assistant account. You can access your account anytime at [email or web address]. Care Team Connect/Care Team Connect Did you know that you can access your hospital and ER discharge instructions at any time in Maintenance Assistant? You can also review all of your test results from your hospital stay or ER visit. Additional Information If you have questions, please visit the Frequently Asked Questions section of the Maintenance Assistant website at https://Care Team [email or web address]. Care Team Connect/Care Team Connect/. Remember, Maintenance Assistant is NOT to be used for urgent needs. For medical emergencies, dial 911. Now available from your iPhone and Android! Please provide this summary of care documentation to your next provider. Your primary care clinician is listed as Nader NOBLES. If you have any questions after today's visit, please call [phone].

## 2018-03-24 LAB
T4 FREE SERPL-MCNC: 0.98 NG/DL (ref 0.82–1.77)
TSH SERPL DL<=0.005 MIU/L-ACNC: 12.64 UIU/ML (ref 0.45–4.5)

## 2018-03-25 RX ORDER — LEVOTHYROXINE SODIUM 100 UG/1
100 TABLET ORAL
Qty: 30 TAB | Refills: 6 | Status: SHIPPED | OUTPATIENT
Start: 2018-03-25 | End: 2018-03-30 | Stop reason: SDUPTHER

## 2018-03-30 RX ORDER — LEVOTHYROXINE SODIUM 100 UG/1
100 TABLET ORAL
Qty: 30 TAB | Refills: 6 | Status: SHIPPED | OUTPATIENT
Start: 2018-03-30 | End: 2018-04-03 | Stop reason: SDUPTHER

## 2018-03-30 RX ORDER — BUPROPION HYDROCHLORIDE 150 MG/1
150 TABLET, EXTENDED RELEASE ORAL 2 TIMES DAILY
Qty: 60 TAB | Refills: 3 | Status: SHIPPED | OUTPATIENT
Start: 2018-03-30 | End: 2018-04-03 | Stop reason: SDUPTHER

## 2018-04-03 RX ORDER — LEVOTHYROXINE SODIUM 100 UG/1
100 TABLET ORAL
Qty: 30 TAB | Refills: 6 | Status: SHIPPED | OUTPATIENT
Start: 2018-04-03 | End: 2018-04-03 | Stop reason: SDUPTHER

## 2018-04-03 RX ORDER — BUPROPION HYDROCHLORIDE 150 MG/1
150 TABLET, EXTENDED RELEASE ORAL 2 TIMES DAILY
Qty: 60 TAB | Refills: 3 | Status: SHIPPED | OUTPATIENT
Start: 2018-04-03 | End: 2018-04-03 | Stop reason: SDUPTHER

## 2018-04-03 RX ORDER — LEVOTHYROXINE SODIUM 100 UG/1
100 TABLET ORAL
Qty: 30 TAB | Refills: 6 | Status: SHIPPED | OUTPATIENT
Start: 2018-04-03 | End: 2018-04-04 | Stop reason: SDUPTHER

## 2018-04-03 RX ORDER — BUPROPION HYDROCHLORIDE 150 MG/1
150 TABLET, EXTENDED RELEASE ORAL 2 TIMES DAILY
Qty: 60 TAB | Refills: 3 | Status: SHIPPED | OUTPATIENT
Start: 2018-04-03 | End: 2018-04-04 | Stop reason: SDUPTHER

## 2018-04-04 RX ORDER — BUPROPION HYDROCHLORIDE 150 MG/1
150 TABLET, EXTENDED RELEASE ORAL 2 TIMES DAILY
Qty: 60 TAB | Refills: 3 | Status: SHIPPED | OUTPATIENT
Start: 2018-04-04 | End: 2018-04-17 | Stop reason: SDUPTHER

## 2018-04-04 RX ORDER — LEVOTHYROXINE SODIUM 100 UG/1
100 TABLET ORAL
Qty: 30 TAB | Refills: 6 | Status: SHIPPED | OUTPATIENT
Start: 2018-04-04 | End: 2018-04-17 | Stop reason: SDUPTHER

## 2018-04-17 RX ORDER — LEVOTHYROXINE SODIUM 100 UG/1
100 TABLET ORAL
Qty: 90 TAB | Refills: 3 | Status: SHIPPED | OUTPATIENT
Start: 2018-04-17 | End: 2018-06-23 | Stop reason: SDUPTHER

## 2018-04-17 RX ORDER — BUPROPION HYDROCHLORIDE 150 MG/1
150 TABLET, EXTENDED RELEASE ORAL 2 TIMES DAILY
Qty: 180 TAB | Refills: 3 | Status: SHIPPED | OUTPATIENT
Start: 2018-04-17 | End: 2018-07-17 | Stop reason: ALTCHOICE

## 2018-04-17 NOTE — TELEPHONE ENCOUNTER
Pt's script needs to go to new [facility] listed. Thanks. Pt has been waiting for scripts for a few days now.

## 2018-06-22 ENCOUNTER — OFFICE VISIT (OUTPATIENT)
Dept: INTERNAL MEDICINE CLINIC | Age: 48
End: 2018-06-22

## 2018-06-22 VITALS
SYSTOLIC BLOOD PRESSURE: 131 MMHG | HEART RATE: 84 BPM | BODY MASS INDEX: 29.44 KG/M2 | OXYGEN SATURATION: 98 % | DIASTOLIC BLOOD PRESSURE: 86 MMHG | HEIGHT: 62 IN | WEIGHT: 160 LBS | TEMPERATURE: 98 F

## 2018-06-22 DIAGNOSIS — F32.A DEPRESSION, UNSPECIFIED DEPRESSION TYPE: ICD-10-CM

## 2018-06-22 DIAGNOSIS — E03.9 HYPOTHYROIDISM, UNSPECIFIED TYPE: Primary | ICD-10-CM

## 2018-06-22 DIAGNOSIS — L98.9 SKIN LESION OF FACE: ICD-10-CM

## 2018-06-22 DIAGNOSIS — Z23 ENCOUNTER FOR IMMUNIZATION: ICD-10-CM

## 2018-06-22 DIAGNOSIS — F17.200 SMOKER: ICD-10-CM

## 2018-06-22 NOTE — PROGRESS NOTES
Chief Complaint   Patient presents with    Thyroid Problem     3 month follow up    Depression     3 month follow up    Nicotine Dependence     3 month follow up

## 2018-06-22 NOTE — PROGRESS NOTES
HISTORY OF Cj Gomes is a 50 y.o. female. HPI   F/u depression, hypothroidism, anemia--s/p total- hyst and b/l salpingectomy--[facility]--Dr Liu  Last visit tsh 12--levothyroxine increased to 100mcg every day  lexapro was changed to wellbutrin and referral to taryn HUITRON , has appt July 17th  Stopped wellbutrin d/t lack improvement  Went to [place] for implantable hormones  Lab labs at [facility] level was low  1 ppd tobacco smokers  3-4 shots of liquor at night to fall asleep    Has had itchy lesions on forehead recently    Last OV\"  Last visit tsh was low 0.025 ( previously <0.006 on levothyroxine 125 mcg every day). and free t 4 also low 0.69 on levothyroxine 88 mcg every day  Cytomel 25 mcg every day was stopped due to low tsh  Has gained 14 lbs in last 4 mos  Dieting now to control weight  Diarrhea resolved     Hb 14 s/p hyst  lexapro was increased to 20 mg every day for increased sxs of depression  Feels depressed --no interest on things , sex drive is down. Still able to work full time  Had diarrhea--celiac profile negative, referred to GI ---but sxs resovled now  Smokes 1.5 ppd now-wants to quit, asking about chantix  Increased etoh 3-4 shots per day    Patient Active Problem List    Diagnosis Date Noted    Iron deficiency anemia due to chronic blood loss 02/16/2017    Hypothyroidism 02/11/2013    Smoker 02/11/2013    Depression 02/11/2013    Anxiety 02/11/2013    AK (actinic keratosis) 02/11/2013     Current Outpatient Prescriptions   Medication Sig Dispense Refill    buPROPion SR (WELLBUTRIN SR) 150 mg SR tablet Take 1 Tab by mouth two (2) times a day. 180 Tab 3    levothyroxine (SYNTHROID) 100 mcg tablet Take 1 Tab by mouth Daily (before breakfast). 90 Tab 3    ibuprofen (ADVIL) 200 mg tablet Take 400 mg by mouth every six (6) hours as needed for Pain.        No Known Allergies   Lab Results  Component Value Date/Time   Glucose 82 05/26/2017 08:23 AM Creatinine 0.48 (L) 05/26/2017 08:23 AM      No results found for: CHOL, CHOLPOCT, HDL, LDL, LDLC, LDLCPOC, LDLCEXT, TRIGL, TGLPOCT, CHHD, CHHDX  Lab Results  Component Value Date/Time   TSH 12.640 (H) 03/23/2018 09:42 AM   T4, Free 0.98 03/23/2018 09:42 AM         ROS    Physical Exam   Constitutional: She appears well-developed and well-nourished. Appears stated age   HENT:   Head:       Cardiovascular: Normal rate, regular rhythm and normal heart sounds. Exam reveals no gallop and no friction rub. No murmur heard. Pulmonary/Chest: Effort normal and breath sounds normal. No respiratory distress. She has no wheezes. Abdominal: Soft. Bowel sounds are normal.   Musculoskeletal: She exhibits no edema. Neurological: She is alert. Psychiatric: She has a normal mood and affect. Nursing note and vitals reviewed. ASSESSMENT and PLAN  Diagnoses and all orders for this visit:    1. Hypothyroidism, unspecified type  -     TSH 3RD GENERATION  -     T4, FREE  -     METABOLIC PANEL, BASIC   Adjust levothyroxine if needed  2. 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 Smoker   chantix and wellbutrin inneffective per pt   Advised work on reduction or cessation  4. Skin lesion of face  -     REFERRAL TO DERMATOLOGY   Some ulceration of right forehead lesion--? Skin biopsy  5. Encounter for immunization  -     Tetanus, diphtheria toxoids and acellular pertussis (TDAP) vaccine, in individuals >=7 years, IM  6. Alcohol abuse   Needs to stop or reduce etoh -discussed    Follow-up Disposition:  Return in about 4 months (around 10/22/2018) for hypothyouid MDD.

## 2018-06-22 NOTE — MR AVS SNAPSHOT
[address] [facility] 
[phone] Patient: Donna Man MRN: [id number] :1970 Visit Information Date & Time Provider Department Dept. Phone Encounter #  
 2018  9:00 AM Humaira Mosqueda, [facility] [phone] [id number] Follow-up Instructions Return in about 4 months (around 10/22/2018) for hypothyouid MDD. Your Appointments 2018  9:00 AM  
New Patient with Radha Pal MD  
[address] [facility] [address] [place] [address]) Appt Note: NP: Dx: Major Depressed: Mood Swings; BC[employer]: Dr Palmira Carlton; told to arriveat 830am; gave directions 4/3/18 [address] [place] [address] [address] [address]  
[address] [place] [address] [address] [facility]. Upcoming Health Maintenance Date Due Pneumococcal 19-64 Medium Risk (1 of 1 - PPSV23) 1987 DTaP/Tdap/Td series (1 - Tdap) 1989 Influenza Age 5 to Adult 2018 PAP AKA CERVICAL CYTOLOGY 2020 Allergies as of 2018  Review Complete On: 2018 By: Zeke Hood LPN No Known Allergies Current Immunizations  Reviewed on 2017 Name Date Tdap  Incomplete Not reviewed this visit You Were Diagnosed With   
  
 Codes Comments Hypothyroidism, unspecified type    -  Primary ICD-10-CM: E03.9 ICD-9-CM: 244.9 Depression, unspecified depression type     ICD-10-CM: F32.9 ICD-9-CM: 794 Smoker     ICD-10-CM: R00.869 ICD-9-CM: 305.1 Skin lesion of face     ICD-10-CM: L98.9 ICD-9-CM: 709.9 Encounter for immunization     ICD-10-CM: S53 ICD-9-CM: V03.89 Vitals BP Pulse Temp Height(growth percentile) Weight(growth percentile) LMP  
 131/86 (BP 1 Location: Left arm, BP Patient Position: Sitting) 84 98 °F (36.7 °C) (Oral) 5' 2\" (1.575 m) 160 lb (72.6 kg) 2017 SpO2 BMI OB Status Smoking Status 98% 29.26 kg/m2 Hysterectomy Current Every Day Smoker BMI and BSA Data Body Mass Index Body Surface Area  
 29.26 kg/m 2 1.78 m 2 Preferred Pharmacy Pharmacy Name Phone [facility] [address] [place] [address] [phone] Your Updated Medication List  
  
   
This list is accurate as of 6/22/18  9:35 AM.  Always use your most recent med list. ADVIL 200 mg tablet Generic drug:  ibuprofen Take 400 mg by mouth every six (6) hours as needed for Pain. buPROPion  mg SR tablet Commonly known as:  Irene Aw Take 1 Tab by mouth two (2) times a day. levothyroxine 100 mcg tablet Commonly known as:  SYNTHROID Take 1 Tab by mouth Daily (before breakfast). We Performed the Following METABOLIC PANEL, BASIC [08779 CPT(R)] REFERRAL TO DERMATOLOGY [REF19 Custom] T4, FREE [id number] CPT(R)] TETANUS, DIPHTHERIA TOXOIDS AND ACELLULAR PERTUSSIS VACCINE (TDAP), IN INDIVIDS. >=7, IM [id number] CPT(R)] TSH 3RD GENERATION [05170 CPT(R)] Follow-up Instructions Return in about 4 months (around 10/22/2018) for hypothyouid MDD. Referral Information Referral ID Referred By Referred To  
  
 [id number] Júnior NOBLES. Addy Horan [address] [place] [address] [address] [facility], [address] [facility] Phone: [phone] Fax: [phone] Visits Status Start Date End Date 1 New Request 6/22/18 6/22/19 If your referral has a status of pending review or denied, additional information will be sent to support the outcome of this decision. Introducing [facility] & HEALTH SERVICES! Dear Seth Stewart: 
Thank you for requesting a Areshay account. Our records indicate that you already have an active Areshay account. You can access your account anytime at [email or web address] Did you know that you can access your hospital and ER discharge instructions at any time in CANWE STUDIOS? You can also review all of your test results from your hospital stay or ER visit. Additional Information If you have questions, please visit the Frequently Asked Questions section of the CANWE STUDIOS website at https://[email or web address] [email or web address]. Biomeasure/Syros Pharmaceuticals/. Remember, CANWE STUDIOS is NOT to be used for urgent needs. For medical emergencies, dial 911. Now available from your iPhone and Android! Please provide this summary of care documentation to your next provider. Your primary care clinician is listed as Thee NOBLES. If you have any questions after today's visit, please call [phone].

## 2018-06-23 LAB
BUN SERPL-MCNC: 10 MG/DL (ref 6–24)
BUN/CREAT SERPL: 16 (ref 9–23)
CALCIUM SERPL-MCNC: 9.3 MG/DL (ref 8.7–10.2)
CHLORIDE SERPL-SCNC: 101 MMOL/L (ref 96–106)
CO2 SERPL-SCNC: 23 MMOL/L (ref 20–29)
CREAT SERPL-MCNC: 0.64 MG/DL (ref 0.57–1)
GFR SERPLBLD CREATININE-BSD FMLA CKD-EPI: 105 ML/MIN/1.73
GFR SERPLBLD CREATININE-BSD FMLA CKD-EPI: 121 ML/MIN/1.73
GLUCOSE SERPL-MCNC: 75 MG/DL (ref 65–99)
POTASSIUM SERPL-SCNC: 4.8 MMOL/L (ref 3.5–5.2)
SODIUM SERPL-SCNC: 140 MMOL/L (ref 134–144)
T4 FREE SERPL-MCNC: 1.39 NG/DL (ref 0.82–1.77)
TSH SERPL DL<=0.005 MIU/L-ACNC: 10.67 UIU/ML (ref 0.45–4.5)

## 2018-06-23 RX ORDER — LEVOTHYROXINE SODIUM 112 UG/1
112 TABLET ORAL
Qty: 90 TAB | Refills: 3 | Status: SHIPPED | OUTPATIENT
Start: 2018-06-23 | End: 2018-08-10 | Stop reason: DRUGHIGH

## 2018-06-24 ENCOUNTER — TELEPHONE (OUTPATIENT)
Dept: INTERNAL MEDICINE CLINIC | Age: 48
End: 2018-06-24

## 2018-07-17 ENCOUNTER — OFFICE VISIT (OUTPATIENT)
Dept: BEHAVIORAL/MENTAL HEALTH CLINIC | Age: 48
End: 2018-07-17

## 2018-07-17 VITALS
BODY MASS INDEX: 29.44 KG/M2 | SYSTOLIC BLOOD PRESSURE: 152 MMHG | WEIGHT: 160 LBS | HEART RATE: 94 BPM | HEIGHT: 62 IN | DIASTOLIC BLOOD PRESSURE: 102 MMHG

## 2018-07-17 DIAGNOSIS — F10.20 ALCOHOL USE DISORDER, SEVERE, DEPENDENCE (HCC): ICD-10-CM

## 2018-07-17 DIAGNOSIS — F43.10 PTSD (POST-TRAUMATIC STRESS DISORDER): ICD-10-CM

## 2018-07-17 DIAGNOSIS — F33.2 SEVERE EPISODE OF RECURRENT MAJOR DEPRESSIVE DISORDER, WITHOUT PSYCHOTIC FEATURES (HCC): ICD-10-CM

## 2018-07-17 DIAGNOSIS — F41.1 GENERALIZED ANXIETY DISORDER: ICD-10-CM

## 2018-07-17 DIAGNOSIS — G47.00 INSOMNIA, UNSPECIFIED TYPE: Primary | ICD-10-CM

## 2018-07-17 RX ORDER — ZOLPIDEM TARTRATE 5 MG/1
5 TABLET ORAL
Qty: 30 TAB | Refills: 2 | Status: SHIPPED | OUTPATIENT
Start: 2018-07-17 | End: 2022-04-01 | Stop reason: ALTCHOICE

## 2018-07-17 RX ORDER — PAROXETINE HYDROCHLORIDE 20 MG/1
20 TABLET, FILM COATED ORAL DAILY
Qty: 30 TAB | Refills: 2 | Status: SHIPPED | OUTPATIENT
Start: 2018-07-17 | End: 2018-11-02 | Stop reason: SDUPTHER

## 2018-07-17 NOTE — MR AVS SNAPSHOT
[address] [facility] 
[phone] Patient: Jean Anand MRN: [id number] :1970 Visit Information Date & Time Provider Department Dept. Phone Encounter #  
 2018  9:00 AM MD Nicolás Salas [phone] [id number] Follow-up Instructions Return in about 4 weeks (around 2018) for Medication management. Your Appointments 2018  4:00 PM  
ESTABLISHED PATIENT with MD Nicolás Salas [address] [facility]6 [facility]) Appt Note: 4 WEEK F/U  
 [facility] [address] [address]  
[facility] [place] [address] [address] [facility] Upcoming Health Maintenance Date Due Pneumococcal 19-64 Medium Risk (1 of 1 - PPSV23) 1987 Influenza Age 5 to Adult 2018 PAP AKA CERVICAL CYTOLOGY 2020 DTaP/Tdap/Td series (2 - Td) 2028 Allergies as of 2018  Review Complete On: 2018 By: Yael Tony MD  
 No Known Allergies Current Immunizations  Reviewed on 2017 Name Date Tdap 2018 Not reviewed this visit You Were Diagnosed With   
  
 Codes Comments Insomnia, unspecified type    -  Primary ICD-10-CM: G47.00 ICD-9-CM: 780.52 PTSD (post-traumatic stress disorder)     ICD-10-CM: F43.10 ICD-9-CM: 309.81 Severe episode of recurrent major depressive disorder, without psychotic features ([address]     ICD-10-CM: F33.2 ICD-9-CM: 296.33 Generalized anxiety disorder     ICD-10-CM: F41.1 ICD-9-CM: 300.02 Alcohol use disorder, severe, dependence ([address]     ICD-10-CM: F10.20 ICD-9-CM: 303.90 Vitals BP Pulse Height(growth percentile) Weight(growth percentile) LMP BMI  
 (!) 152/102 94 5' 2\" (1.575 m) 160 lb (72.6 kg) 2017 29.26 kg/m2 OB Status Smoking Status Hysterectomy Current Every Day Smoker Vitals History BMI and BSA Data Body Mass Index Body Surface Area  
 29.26 kg/m 2 1.78 m 2 Preferred Pharmacy Pharmacy Name Phone [facility] [address] [place] [address] [phone] Your Updated Medication List  
  
   
This list is accurate as of 7/17/18  9:38 AM.  Always use your most recent med list. ADVIL 200 mg tablet Generic drug:  ibuprofen Take 400 mg by mouth every six (6) hours as needed for Pain.  
  
 levothyroxine 112 mcg tablet Commonly known as:  SYNTHROID Take 1 Tab by mouth Daily (before breakfast). PARoxetine 20 mg tablet Commonly known as:  PAXIL Take 1 Tab by mouth daily. Indications: Generalized Anxiety Disorder, major depressive disorder, Post Traumatic Stress Disorder  
  
 zolpidem 5 mg tablet Commonly known as:  AMBIEN Take 1 Tab by mouth nightly as needed for Sleep. Max Daily Amount: 5 mg. Indications: SLEEP-ONSET INSOMNIA Prescriptions Printed Refills  
 zolpidem (AMBIEN) 5 mg tablet 2 Sig: Take 1 Tab by mouth nightly as needed for Sleep. Max Daily Amount: 5 mg. Indications: SLEEP-ONSET INSOMNIA Class: Print Route: Oral  
  
Prescriptions Sent to Pharmacy Refills PARoxetine (PAXIL) 20 mg tablet 2 Sig: Take 1 Tab by mouth daily. Indications: Generalized Anxiety Disorder, major depressive disorder, Post Traumatic Stress Disorder Class: Normal  
 Pharmacy: [facility] [address] [place] [address] #: [phone] Route: Oral  
  
Follow-up Instructions Return in about 4 weeks (around 8/14/2018) for Medication management. Introducing [facility] & HEALTH SERVICES! Dear [place]: 
Thank you for requesting a NationBuilder account. Our records indicate that you already have an active NationBuilder account.   You can access your account anytime at [email or web address]. Studio Kate/Apartment List Did you know that you can access your hospital and ER discharge instructions at any time in Germin8? You can also review all of your test results from your hospital stay or ER visit. Additional Information If you have questions, please visit the Frequently Asked Questions section of the Germin8 website at [email or web address] [email or web address] Remember, Germin8 is NOT to be used for urgent needs. For medical emergencies, dial 911. Now available from your iPhone and Android! Please provide this summary of care documentation to your next provider. Your primary care clinician is listed as Tim NOBLES. If you have any questions after today's visit, please call [phone].
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
## 2018-08-06 ENCOUNTER — OFFICE VISIT (OUTPATIENT)
Dept: INTERNAL MEDICINE CLINIC | Age: 48
End: 2018-08-06

## 2018-08-06 VITALS
WEIGHT: 160 LBS | OXYGEN SATURATION: 99 % | HEART RATE: 86 BPM | SYSTOLIC BLOOD PRESSURE: 182 MMHG | DIASTOLIC BLOOD PRESSURE: 105 MMHG | TEMPERATURE: 97.8 F | BODY MASS INDEX: 29.44 KG/M2 | RESPIRATION RATE: 18 BRPM | HEIGHT: 62 IN

## 2018-08-06 DIAGNOSIS — I10 ACCELERATED HYPERTENSION: Primary | ICD-10-CM

## 2018-08-06 DIAGNOSIS — E03.9 ACQUIRED HYPOTHYROIDISM: ICD-10-CM

## 2018-08-06 RX ORDER — LOSARTAN POTASSIUM AND HYDROCHLOROTHIAZIDE 12.5; 5 MG/1; MG/1
1 TABLET ORAL DAILY
Qty: 30 TAB | Refills: 1 | Status: SHIPPED | OUTPATIENT
Start: 2018-08-06 | End: 2018-09-29 | Stop reason: SDUPTHER

## 2018-08-06 NOTE — MR AVS SNAPSHOT
[address] [facility] 
[phone] Patient: Mannie Benoit MRN: [id number] :1970 Visit Information Date & Time Provider Department Dept. Phone Encounter #  
 2018  8:45 AM Cody Sheffield, [address] [place] [address],[address] Floor [phone] [id number] Follow-up Instructions Return in about 2 weeks (around 2018) for High blood pressure. Your Appointments 2018  4:00 PM  
ESTABLISHED PATIENT with Latanya Saravia MD  
[address] [facility]) Appt Note: 4 WEEK F/U  
 [id number] [address] [facility] Upcoming Health Maintenance Date Due Pneumococcal 19-64 Medium Risk (1 of 1 - PPSV23) 1987 Influenza Age 5 to Adult 2018 PAP AKA CERVICAL CYTOLOGY 2020 DTaP/Tdap/Td series (2 - Td) 2028 Allergies as of 2018  Review Complete On: 2018 By: Efrain Hansen No Known Allergies Current Immunizations  Reviewed on 2017 Name Date Tdap 2018 Not reviewed this visit You Were Diagnosed With   
  
 Codes Comments Accelerated hypertension    -  Primary ICD-10-CM: I10 
ICD-9-CM: 401.0 Acquired hypothyroidism     ICD-10-CM: E03.9 ICD-9-CM: 652. 9 Vitals BP Pulse Temp Resp Height(growth percentile) Weight(growth percentile) (!) 182/105 86 97.8 °F (36.6 °C) (Oral) 18 5' 2\" (1.575 m) 160 lb (72.6 kg) LMP SpO2 BMI OB Status Smoking Status 2017 99% 29.26 kg/m2 Hysterectomy Current Every Day Smoker Vitals History BMI and BSA Data Body Mass Index Body Surface Area  
 29.26 kg/m 2 1.78 m 2 Preferred Pharmacy Pharmacy Name Phone [id number] [facility], [address] [place] [address] [phone] Your Updated Medication List  
  
   
This list is accurate as of 8/6/18  9:25 AM.  Always use your most recent med list. ADVIL 200 mg tablet Generic drug:  ibuprofen Take 400 mg by mouth every six (6) hours as needed for Pain.  
  
 levothyroxine 112 mcg tablet Commonly known as:  SYNTHROID Take 1 Tab by mouth Daily (before breakfast). losartan-hydroCHLOROthiazide 50-12.5 mg per tablet Commonly known as:  HYZAAR Take 1 Tab by mouth daily. PARoxetine 20 mg tablet Commonly known as:  PAXIL Take 1 Tab by mouth daily. Indications: Generalized Anxiety Disorder, major depressive disorder, Post Traumatic Stress Disorder  
  
 zolpidem 5 mg tablet Commonly known as:  AMBIEN Take 1 Tab by mouth nightly as needed for Sleep. Max Daily Amount: 5 mg. Indications: SLEEP-ONSET INSOMNIA Prescriptions Sent to Pharmacy Refills  
 losartan-hydroCHLOROthiazide (HYZAAR) 50-12.5 mg per tablet 1 Sig: Take 1 Tab by mouth daily. Class: Normal  
 Pharmacy: [facility] [place] [address] #: [phone] Route: Oral  
  
We Performed the Following ALDOSTERONE/RENIN ACTIVITY [id number] CPT(R)] CBC WITH AUTOMATED DIFF [33156 CPT(R)] METABOLIC PANEL, BASIC [83499 CPT(R)] TSH AND FREE T4 [15079 CPT(R)] URINALYSIS W/ RFLX MICROSCOPIC [24026 CPT(R)] Follow-up Instructions Return in about 2 weeks (around 8/20/2018) for High blood pressure. To-Do List   
 08/06/2018 ECHO:  2D ECHO COMPLETE ADULT (TTE) W OR WO CONTR   
  
 08/06/2018 Imaging:  DUPLEX RENAL ART/JOSE ANTONIO BILATERAL Patient Instructions   
-Purchase a blood pressure cuff that goes around your upper arm and check blood pressure at least 3 times per week. Write down your numbers for review. Check blood pressure in the morning and evening.  Rest for 5 minutes with feet elevated and arm resting on a table (at mid-chest level) when checking blood pressure Goal blood pressure is 125/80 
 
-Exercise 30-60 minutes most days of the week, preferably with a mix of cardiovascular and strength training. Exercise can improve blood pressure, even if you do not lose weight! 
 
-Limit alcohol intake to less than 2-3 drinks daily -Avoid tobacco products -Avoid caffeine, energy drinks, stimulants -DASH diet - includes fruits, vegetables, fiber, and less than 2000 mg sodium (salt) daily. Try to eat less than 7193-4483 calories daily. Limit fat intake.  
 
-Avoid non-steroidal inflammatory medications (NSAIDS) such as ibuprofen, advil, motrin, aleve, and naproxyn as these may increase blood pressure if used long-term 
 
-Avoid OTC decongestants such as pseudopherine, phenylephrine, Afrin 
 
 
------------------- Start blood pressure medication 
 
- losartan-hydroCHLOROthiazide (HYZAAR) 50-12.5 mg per tablet; Take 1 Tab by mouth daily. Dispense: 30 Tab; Refill: 1 Introducing [facility]! Dear [place]: 
Thank you for requesting a [employer] account. Our records indicate that you already have an active [employer] account. You can access your account anytime at [email or web address] Did you know that you can access your hospital and ER discharge instructions at any time in [employer]? You can also review all of your test results from your hospital stay or ER visit. Additional Information If you have questions, please visit the Frequently Asked Questions section of the [employer] website at [email or web address] Remember, [employer] is NOT to be used for urgent needs. For medical emergencies, dial 911. Now available from your iPhone and Android! Please provide this summary of care documentation to your next provider. Your primary care clinician is listed as Madhu NOBLES. If you have any questions after today's visit, please call [phone].

## 2018-08-06 NOTE — PROGRESS NOTES
Reviewed record in preparation for visit and have obtained necessary documentation. Identified pt with two pt identifiers(name and ). Chief Complaint   Patient presents with    Heartburn    Hypertension    Headache       Health Maintenance Due   Topic Date Due    Pneumococcal Vaccine (1 of  - PPSV23) 1987    Flu Vaccine  2018       Ms. Malou Mitchell has a reminder for a \"due or due soon\" health maintenance. I have asked that she discuss this further with her primary care provider for follow-up on this health maintenance. Coordination of Care Questionnaire:  :     1) Have you been to an emergency room, urgent care clinic since your last visit? no   Hospitalized since your last visit? no             2) Have you seen or consulted any other health care providers outside of [address] [place] [address] since your last visit? no  (Include any pap smears or colon screenings in this section.)    3) In the event something were to happen to you and you were unable to speak on your behalf, do you have an Advance Directive/ Living Will in place stating your wishes? NO    Do you have an Advance Directive on file? no    4) Are you interested in receiving information on Advance Directives? NO    Patient is accompanied by  I have received verbal consent from [place] to discuss any/all medical information while they are present in the room.

## 2018-08-06 NOTE — PROGRESS NOTES
CC: Heartburn; Hypertension; and Headache  Dr Freddy Palomo patient     HPI:    She is a 50 y.o. female who presents for evaluation of new HTN    In previous visits with Dr Beryle Levee patient had normal blood pressure. Patient notes increased in blood pressure - patient went to her psychiatrist and noted to have very elevated blood pressure. Then checking at home and range in 190/ 100s. Patient notes increased swelling in hands and feet. Notes headache daily     Complains of heart burn - acid reflux - in mid chest and nausea    Smoking 1.5 packs per day     Has mild chronic dyspnea \" from smoking\"     Denies PND    ROS:  10 systems reviewed and negative other than HPI     Past Medical History:   Diagnosis Date    Anemia     Depression     Graves disease     Hepatitis A age 10    treated    History of blood transfusion 04/27/2017    2 units PRBC    Menorrhagia        Current Outpatient Prescriptions on File Prior to Visit   Medication Sig Dispense Refill    PARoxetine (PAXIL) 20 mg tablet Take 1 Tab by mouth daily. Indications: Generalized Anxiety Disorder, major depressive disorder, Post Traumatic Stress Disorder 30 Tab 2    zolpidem (AMBIEN) 5 mg tablet Take 1 Tab by mouth nightly as needed for Sleep. Max Daily Amount: 5 mg. Indications: SLEEP-ONSET INSOMNIA 30 Tab 2    levothyroxine (SYNTHROID) 112 mcg tablet Take 1 Tab by mouth Daily (before breakfast). 90 Tab 3    ibuprofen (ADVIL) 200 mg tablet Take 400 mg by mouth every six (6) hours as needed for Pain. No current facility-administered medications on file prior to visit.         Past Surgical History:   Procedure Laterality Date    HX COLONOSCOPY      HX DILATION AND CURETTAGE  05/18/2017       Family History   Problem Relation Age of Onset    Cancer Mother      thyroid cancer and depression    Psychiatric Disorder Mother     Alcohol abuse Mother     Alcohol abuse Father     Diabetes Brother     Heart Disease Brother      Reviewed and no changes     Social History     Social History    Marital status: SINGLE     Spouse name: N/A    Number of children: N/A    Years of education: N/A     Occupational History    Not on file.      Social History Main Topics    Smoking status: Current Every Day Smoker     Packs/day: 1.00     Years: 30.00    Smokeless tobacco: Never Used    Alcohol use Yes      Comment: shot of liquor -drink daily    Drug use: Yes     Special: Prescription, OTC    Sexual activity: Yes     Partners: Male     Birth control/ protection: Surgical     Other Topics Concern    Not on file     Social History Narrative            Visit Vitals    BP (!) 182/105    Pulse 86    Temp 97.8 °F (36.6 °C) (Oral)    Resp 18    Ht 5' 2\" (1.575 m)    Wt 160 lb (72.6 kg)    LMP 05/22/2017    SpO2 99%    BMI 29.26 kg/m2       Physical Examination:   General - Well appearing female  HEENT - PERRL, mild periorbital edema  Neck - supple, no bruits, no TMG, no LAD  Pulm - clear to auscultation bilaterally  Cardio - RRR, normal S1 S2, no murmur gallops or rubs  Abd - soft, nontender, no masses, no HSM  Extrem - mild swelling in hands, no edema in legs,  +2 distal pulses  Psych - normal affect, appropriate mood    Lab Results   Component Value Date/Time    WBC 7.4 12/20/2017 09:47 AM    Hemoglobin (POC) 9.1 (A) 05/18/2017 11:02 AM    HGB 14.0 12/20/2017 09:47 AM    HCT 42.3 12/20/2017 09:47 AM    PLATELET 701 94/66/2114 09:47 AM    MCV 87 12/20/2017 09:47 AM     Lab Results   Component Value Date/Time    Sodium 140 06/22/2018 09:54 AM    Potassium 4.8 06/22/2018 09:54 AM    Chloride 101 06/22/2018 09:54 AM    CO2 23 06/22/2018 09:54 AM    Anion gap 5 05/26/2017 08:23 AM    Glucose 75 06/22/2018 09:54 AM    BUN 10 06/22/2018 09:54 AM    Creatinine 0.64 06/22/2018 09:54 AM    BUN/Creatinine ratio 16 06/22/2018 09:54 AM    GFR est  06/22/2018 09:54 AM    GFR est non- 06/22/2018 09:54 AM    Calcium 9.3 06/22/2018 09:54 AM     No results found for: CHOL, CHOLPOCT, CHOLX, CHLST, CHOLV, HDL, HDLPOC, LDL, LDLCPOC, LDLC, DLDLP, VLDLC, VLDL, TGLX, TRIGL, TRIGP, TGLPOCT, CHHD, CHHDX  Lab Results   Component Value Date/Time    TSH 10.670 (H) 06/22/2018 09:54 AM     No results found for: PSA, PSA2, PSAR1, PSA1, PSAR2, PSA3, PSAR3, [id number], [id number], PSALT  No results found for: HBA1C, HGBE8, ZOF4OLKR, KQN3PFCU, REU2FRQH  No results found for: Benson Gómez JIMENARICEE    Lab Results   Component Value Date/Time    ALT (SGPT) 23 05/26/2017 08:23 AM    AST (SGOT) 20 05/26/2017 08:23 AM    Alk. phosphatase 72 05/26/2017 08:23 AM    Bilirubin, total 0.4 05/26/2017 08:23 AM      EKG in 2017 was normal      Assessment/Plan:    1. Accelerated hypertension  New diagnosis- very elevated blood pressure going on for 1 month. Patient with mild preorbital edema and hand edema. Concerning for possible nephrotic syndrome - I will check urine and renal duplex, and renin / prieto level looking for cause of elevated blood pressure  - URINALYSIS W/ RFLX MICROSCOPIC  - DUPLEX RENAL ART/JOSE ANTONIO BILATERAL; Future  - ALDOSTERONE/RENIN ACTIVITY  - METABOLIC PANEL, BASIC  - CBC WITH AUTOMATED DIFF  - losartan-hydroCHLOROthiazide (HYZAAR) 50-12.5 mg per tablet; Take 1 Tab by mouth daily. Dispense: 30 Tab; Refill: 1  - ECHO of the heart  - follow up in 2 weeks     2. Acquired hypothyroidism  Dr Jerrell White adjusted dose 6 weeks ago   - TSH AND FREE T4    3. Tobacco abuse: encouraged to cut down/ quit smoking - currently smoking 1.5 pack to 2 packs day     4. GERD: take ranitidine daily / lifestyle modifications     Reminded to schedule mammogram    Follow-up Disposition:  Return in about 4 weeks (around 9/3/2018) for High blood pressure.     Paul Mcwilliams MD

## 2018-08-06 NOTE — PATIENT INSTRUCTIONS
-Purchase a blood pressure cuff that goes around your upper arm and check blood pressure at least 3 times per week. Write down your numbers for review. Check blood pressure in the morning and evening. Rest for 5 minutes with feet elevated and arm resting on a table (at mid-chest level) when checking blood pressure  Goal blood pressure is 125/80    -Exercise 30-60 minutes most days of the week, preferably with a mix of cardiovascular and strength training. Exercise can improve blood pressure, even if you do not lose weight!    -Limit alcohol intake to less than 2-3 drinks daily     -Avoid tobacco products    -Avoid caffeine, energy drinks, stimulants    -DASH diet - includes fruits, vegetables, fiber, and less than 2000 mg sodium (salt) daily. Try to eat less than 9779-0453 calories daily. Limit fat intake.     -Avoid non-steroidal inflammatory medications (NSAIDS) such as ibuprofen, advil, motrin, aleve, and naproxyn as these may increase blood pressure if used long-term    -Avoid OTC decongestants such as pseudopherine, phenylephrine, Afrin      -------------------    Start blood pressure medication    - losartan-hydroCHLOROthiazide (HYZAAR) 50-12.5 mg per tablet; Take 1 Tab by mouth daily. Dispense: 30 Tab;  Refill: 1

## 2018-08-09 ENCOUNTER — HOSPITAL ENCOUNTER (OUTPATIENT)
Dept: VASCULAR SURGERY | Age: 48
Discharge: HOME | End: 2018-08-09
Attending: INTERNAL MEDICINE
Payer: COMMERCIAL

## 2018-08-09 DIAGNOSIS — I10 ACCELERATED HYPERTENSION: ICD-10-CM

## 2018-08-09 PROCEDURE — 93975 VASCULAR STUDY: CPT

## 2018-08-09 NOTE — PROCEDURES
Wen  *** FINAL REPORT ***    Name: Gaurav Demraco  MRN: [id number]    Outpatient  : 05 Dec 1970  HIS Order #: [id number]  [address] [facility] Visit #: [id number]  Date: 09 Aug 2018    TYPE OF TEST: Visceral Arterial Duplex    REASON FOR TEST  Eval for renal vascular HTN    Aortic PSV:  88.8 cm/s  Diameter AP: 2.0 cm   TV: 2.0 cm                   Right          Left  Renal Artery:- -------------  -------------  Proximal  PSV:  Mid       PSV:  Distal    PSV:  Aortic ratio :    Medullary PSV:            EDV:            EDR:            SDR:    Cortical  PSV:            EDV:            EDR:            SDR:  Stenosis:  Kidney size:   10.8 cm             cm               x  6.1 cm      x      cm    Hilar:-        Right          Left  Acc. Time  AT:     secs           secs  Acc. Index AI:             RI: 0.69           0.67    Mesenteric:-                  Prox   Mid   Dist Ratio Stenosis          Aneurysm                  ----- ----- ----- ----- ----------------- ------------  SMA:            448.6              5.1 Severe > 70%  Celiac:         358.3               4.0 Severe > 70%  Hepatic:  Splenic:  MULUGETA:  :    INTERPRETATION/FINDINGS  PROCEDURE: Evaluation of renal arteries with ultrasound (B-mode  imaging, pulsed Doppler, color Doppler). Includes the aorta, celiac  artery, superior mesenteric artery, renal arteries, segmental  arteries, and renal vein. FINDINGS:  RENAL:  1. The right kidney measures 10.8 cm. MESENTERIC:  1. 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 2. Severe > 70% stenosis in the celiac artery. ADDITIONAL COMMENTS    I have personally reviewed the data relevant to the interpretation of  this  study. TECHNOLOGIST: Demetrice Kwon RVT  Signed: 2018 09:12 AM    PHYSICIAN: Evelyn Guzman.  Ambrosio Bullock MD  Signed: 2018 11:16 AM

## 2018-08-10 LAB
ALDOST SERPL-MCNC: 1.3 NG/DL (ref 0–30)
APPEARANCE UR: CLEAR
BASOPHILS # BLD AUTO: 0 X10E3/UL (ref 0–0.2)
BASOPHILS NFR BLD AUTO: 0 %
BILIRUB UR QL STRIP: NEGATIVE
BUN SERPL-MCNC: 8 MG/DL (ref 6–24)
BUN/CREAT SERPL: 14 (ref 9–23)
CALCIUM SERPL-MCNC: 8.6 MG/DL (ref 8.7–10.2)
CHLORIDE SERPL-SCNC: 101 MMOL/L (ref 96–106)
CO2 SERPL-SCNC: 22 MMOL/L (ref 20–29)
COLOR UR: YELLOW
CREAT SERPL-MCNC: 0.57 MG/DL (ref 0.57–1)
EOSINOPHIL # BLD AUTO: 0.1 X10E3/UL (ref 0–0.4)
EOSINOPHIL NFR BLD AUTO: 1 %
ERYTHROCYTE [DISTWIDTH] IN BLOOD BY AUTOMATED COUNT: 12.9 % (ref 12.3–15.4)
GLUCOSE SERPL-MCNC: 80 MG/DL (ref 65–99)
GLUCOSE UR QL: NEGATIVE
HCT VFR BLD AUTO: 46.8 % (ref 34–46.6)
HGB BLD-MCNC: 15 G/DL (ref 11.1–15.9)
HGB UR QL STRIP: NEGATIVE
IMM GRANULOCYTES # BLD: 0 X10E3/UL (ref 0–0.1)
IMM GRANULOCYTES NFR BLD: 0 %
KETONES UR QL STRIP: NEGATIVE
LEUKOCYTE ESTERASE UR QL STRIP: NEGATIVE
LYMPHOCYTES # BLD AUTO: 1.7 X10E3/UL (ref 0.7–3.1)
LYMPHOCYTES NFR BLD AUTO: 23 %
MCH RBC QN AUTO: 32.3 PG (ref 26.6–33)
MCHC RBC AUTO-ENTMCNC: 32.1 G/DL (ref 31.5–35.7)
MCV RBC AUTO: 101 FL (ref 79–97)
MICRO URNS: NORMAL
MONOCYTES # BLD AUTO: 0.6 X10E3/UL (ref 0.1–0.9)
MONOCYTES NFR BLD AUTO: 8 %
NEUTROPHILS # BLD AUTO: 5.3 X10E3/UL (ref 1.4–7)
NEUTROPHILS NFR BLD AUTO: 68 %
NITRITE UR QL STRIP: NEGATIVE
PH UR STRIP: 7 [PH] (ref 5–7.5)
PLATELET # BLD AUTO: 194 X10E3/UL (ref 150–379)
POTASSIUM SERPL-SCNC: 4.3 MMOL/L (ref 3.5–5.2)
PROT UR QL STRIP: NEGATIVE
RBC # BLD AUTO: 4.65 X10E6/UL (ref 3.77–5.28)
RENIN PLAS-CCNC: <0.167 NG/ML/HR (ref 0.17–5.38)
SODIUM SERPL-SCNC: 137 MMOL/L (ref 134–144)
SP GR UR: 1.01 (ref 1–1.03)
T4 FREE SERPL-MCNC: 1.29 NG/DL (ref 0.82–1.77)
TSH SERPL DL<=0.005 MIU/L-ACNC: 11.82 UIU/ML (ref 0.45–4.5)
UROBILINOGEN UR STRIP-MCNC: 0.2 MG/DL (ref 0.2–1)
WBC # BLD AUTO: 7.7 X10E3/UL (ref 3.4–10.8)

## 2018-08-10 RX ORDER — LEVOTHYROXINE SODIUM 125 UG/1
125 TABLET ORAL
Qty: 30 TAB | Refills: 4 | Status: SHIPPED | OUTPATIENT
Start: 2018-08-10 | End: 2019-02-25 | Stop reason: SDUPTHER

## 2018-08-10 NOTE — PROGRESS NOTES
Normal urine, normal kidney function  Normal blood count  TSH is elevated therefore will increase the dose of levothyroxine - take 30 minutes before eating on empty stomach with no other pills

## 2018-08-14 ENCOUNTER — OFFICE VISIT (OUTPATIENT)
Dept: BEHAVIORAL/MENTAL HEALTH CLINIC | Age: 48
End: 2018-08-14

## 2018-08-14 VITALS
HEART RATE: 112 BPM | HEIGHT: 62 IN | SYSTOLIC BLOOD PRESSURE: 92 MMHG | DIASTOLIC BLOOD PRESSURE: 67 MMHG | BODY MASS INDEX: 30 KG/M2 | WEIGHT: 163 LBS

## 2018-08-14 DIAGNOSIS — F41.1 GENERALIZED ANXIETY DISORDER: ICD-10-CM

## 2018-08-14 DIAGNOSIS — G47.00 INSOMNIA, UNSPECIFIED TYPE: Primary | ICD-10-CM

## 2018-08-14 DIAGNOSIS — F43.10 PTSD (POST-TRAUMATIC STRESS DISORDER): ICD-10-CM

## 2018-08-14 DIAGNOSIS — F10.20 ALCOHOL USE DISORDER, SEVERE, DEPENDENCE (HCC): ICD-10-CM

## 2018-08-14 DIAGNOSIS — F33.2 SEVERE EPISODE OF RECURRENT MAJOR DEPRESSIVE DISORDER, WITHOUT PSYCHOTIC FEATURES (HCC): ICD-10-CM

## 2018-08-14 NOTE — PROGRESS NOTES
Jose David Kimjose armando  1970  50 y.o.  female  WHITE [place]     Chief Complaint   Patient presents with    Depression    Anxiety    Insomnia    Alcohol Problem       [place]:   This is a 50years old divorce  female with past psychiatric history and treatment of PTSD who is referred by her PCP Dr. Lionel Samuel for medication management and to establish care. She was seen for the first time on July 17 when she decided to restart Paxil 10 mg increasing to 20 mg and try Ambien 5 mg as needed for insomnia. She presented on time, was observed to be significantly improved and not anxious and depressed, tearful, and tired as her last visit. She was alert awake oriented to time person and place and was cooperative, polite, and pleasant during the interview. Patient report compliance with Paxil 20 mg at bedtime and has not required Ambien for past 2 weeks as she is able to sleep. She informed that she quit her stressful job and her 7 years of boyfriend is moving out of her apartment as they are ending the relationship, she said he is 21years older and she is fed up with him. She denied any depression, anxiety, insomnia today and requested to continue current treatment plan and return in 4 weeks for reevaluation. She was provided with support and psychoeducation, and after discussing risk/benefits/expectations with treatment alternatives available, she agreed to plan. She refused to decrease smoking cigarette but agreed to limit her drinking to no more than 4 mixed drinks that she return in 4 weeks for reevaluation. She denies any suicidal ideation, intent, plan and she agreed to call us or go to ED in case of emergency.       SUBSTANCE USE HISTORY:   TOBACCO: Started to smoke at age 13 and currently smokes 1.5-2 packs per day. ALCOHOL: Report 10 years of abuse and currently drink 5-6 mixed drinks every night and on weekend up to 10 to pass out. CANNABIS: Tried but denies any abuse.   COCAINE, OPIOIDS, and OTHERS: Patient denies. MEDICAL HISTORY:    has a past medical history of Anemia; Depression; Graves disease; Hepatitis A (age 10); History of blood transfusion (04/27/2017); and Menorrhagia. She also has no past medical history of Adverse effect of anesthesia. ALLERGIES:   No Known Allergies    VITAL SIGNS:  BP 92/67  Pulse (!) 112  Ht 5' 2\" (1.575 m)  Wt 73.9 kg (163 lb)  LMP 05/22/2017  BMI 29.81 kg/m2    Current Outpatient Prescriptions   Medication Sig Dispense Refill    levothyroxine (SYNTHROID) 125 mcg tablet Take 1 Tab by mouth Daily (before breakfast). 30 Tab 4    losartan-hydroCHLOROthiazide (HYZAAR) 50-12.5 mg per tablet Take 1 Tab by mouth daily. 30 Tab 1    PARoxetine (PAXIL) 20 mg tablet Take 1 Tab by mouth daily. Indications: Generalized Anxiety Disorder, major depressive disorder, Post Traumatic Stress Disorder 30 Tab 2    zolpidem (AMBIEN) 5 mg tablet Take 1 Tab by mouth nightly as needed for Sleep. Max Daily Amount: 5 mg. Indications: SLEEP-ONSET INSOMNIA 30 Tab 2    ibuprofen (ADVIL) 200 mg tablet Take 400 mg by mouth every six (6) hours as needed for Pain. MENTAL STATUS EXAMINATION:   Patient is a 50 y.o. female [facility] who looks much older than her stated age. Patient appearance is casually dressed with limited hygiene. Speech is regular rate and rhythm, fluent language, and thought process is linear, logical, and goal directed. Reported mood is okay with mood congruent somewhat constricted affect. Patient denies any suicidal ideation, homicidal ideation, and auditory visual hallucination. Not observed to be delusional or paranoid. Insight, judgement, reliability and impulse control is limited. Her cognition was within normal limit and she was observed to be reliable. DIAGNOSIS:  Encounter Diagnoses   Name Primary?     Insomnia, unspecified type Yes    PTSD (post-traumatic stress disorder)     Severe episode of recurrent major depressive disorder, without psychotic features ([facility] [address]     Generalized anxiety disorder     Alcohol use disorder, severe, dependence ([facility] [address]        PLAN:  1. MEDICATION: Patient report compliance with Paxil 20 mg daily, is able to tolerate, and requested to continue and return in 4 weeks for reevaluation. She has not required Ambien in last 2 weeks and is able to sleep well. Patient was provided with psycho education, discussed risk/benefits, and expectations from medication changes. Patient agrees with plan. 2. PSYCHOTHERAPY: Patient was provided with supportive therapy, strongly encourage to seek psychotherapy. 3. MEDICAL CARE: Patient was strongly encourage to take their medical medications and follow up with their PCP on regular basis. She gained 3 pounds in past 1 week and her current weight is 163 pounds. 4. SUBSTANCE ABUSE CARE: Patient is smoke 1-1/2 pack per day, drink 4 shots every day, and denies any drug abuse at this time. 5. FOLLOW UP:   Follow-up Disposition:  Return in about 4 weeks (around 9/11/2018) for Medication management.

## 2018-08-14 NOTE — PROGRESS NOTES
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   I would like patient to see a vascular surgeon - please place referral to Dr Diego Roy for mesenteric artery stenosis - for some reason I was unable to place referral remotely  Patient must quit smoking

## 2018-08-14 NOTE — MR AVS SNAPSHOT
[address] [facility] 
[phone] Patient: Wilver Gould MRN: [id number] :1970 Visit Information Date & Time Provider Department Dept. Phone Encounter #  
 2018  4:00 PM Abdiaziz Rivero, [address] [facility] [phone] [id number] Your Appointments 2018  4:00 PM  
ESTABLISHED PATIENT with Abdiaziz Rivero MD  
[address]) Appt Note: 4 WEEK F/U; confirmed 8/10  
 [address] [facility]  
[phone]  
  
   
 [address] [facility] [address] [address]  
  
    
 2018  9:15 AM  
ROUTINE CARE with Timbo [address] [place] [address],[address]) Appt Note: . [address] [address]  
[address] [facility] Upcoming Health Maintenance Date Due Pneumococcal 19-64 Medium Risk (1 of 1 - PPSV23) 1987 Influenza Age 5 to Adult 2018 PAP AKA CERVICAL CYTOLOGY 2020 DTaP/Tdap/Td series (2 - Td) 2028 Allergies as of 2018  Review Complete On: 2018 By: Jovani Moffett No Known Allergies Current Immunizations  Reviewed on 2017 Name Date Tdap 2018 Not reviewed this visit Vitals BP Pulse Height(growth percentile) Weight(growth percentile) LMP BMI  
 92/67 (!) 112 5' 2\" (1.575 m) 163 lb (73.9 kg) 2017 29.81 kg/m2 OB Status Smoking Status Hysterectomy Current Every Day Smoker Vitals History BMI and BSA Data Body Mass Index Body Surface Area  
 29.81 kg/m 2 1.8 m 2 Preferred Pharmacy Pharmacy Name Phone [phone] [phone] Your Updated Medication List  
  
   
 This list is accurate as of 8/14/18  3:47 PM.  Always use your most recent med list. ADVIL 200 mg tablet Generic drug:  ibuprofen Take 400 mg by mouth every six (6) hours as needed for Pain.  
  
 levothyroxine 125 mcg tablet Commonly known as:  SYNTHROID Take 1 Tab by mouth Daily (before breakfast). losartan-hydroCHLOROthiazide 50-12.5 mg per tablet Commonly known as:  HYZAAR Take 1 Tab by mouth daily. PARoxetine 20 mg tablet Commonly known as:  PAXIL Take 1 Tab by mouth daily. Indications: Generalized Anxiety Disorder, major depressive disorder, Post Traumatic Stress Disorder  
  
 zolpidem 5 mg tablet Commonly known as:  AMBIEN Take 1 Tab by mouth nightly as needed for Sleep. Max Daily Amount: 5 mg. Indications: SLEEP-ONSET INSOMNIA To-Do List   
 08/31/2018 11:00 AM  
(Arrive by 10:30 AM) Appointment with ECHOCARDIOGRAM ROOM [address] [place] [address]; ECHOCARDIOGRAM ROOM [facility] NON-INVASIVE CARD ([phone]) Please be prepared to remove everything from the waist up and put on a gown. Introducing [facility] & HEALTH SERVICES! Dear [place]: 
Thank you for requesting a [employer] account. Our records indicate that you already have an active [employer] account. You can access your account anytime at [email or web address] Did you know that you can access your hospital and ER discharge instructions at any time in Secrette? You can also review all of your test results from your hospital stay or ER visit. Additional Information If you have questions, please visit the Frequently Asked Questions section of the [employer] website at [email or web address] Remember, [employer] is NOT to be used for urgent needs. For medical emergencies, dial 911. Now available from your iPhone and Android! Please provide this summary of care documentation to your next provider. Your primary care clinician is listed as Delfin NOBLES. If you have any questions after today's visit, please call [phone].

## 2018-08-15 ENCOUNTER — TELEPHONE (OUTPATIENT)
Dept: INTERNAL MEDICINE CLINIC | Age: 48
End: 2018-08-15

## 2018-09-29 DIAGNOSIS — I10 ACCELERATED HYPERTENSION: ICD-10-CM

## 2018-09-29 RX ORDER — LOSARTAN POTASSIUM AND HYDROCHLOROTHIAZIDE 12.5; 5 MG/1; MG/1
TABLET ORAL
Qty: 30 TAB | Refills: 1 | Status: SHIPPED | OUTPATIENT
Start: 2018-09-29 | End: 2018-12-03 | Stop reason: SDUPTHER

## 2018-10-04 DIAGNOSIS — L98.9 SKIN LESION OF FACE: Primary | ICD-10-CM

## 2018-10-31 RX ORDER — PAROXETINE HYDROCHLORIDE 20 MG/1
TABLET, FILM COATED ORAL
Qty: 30 TAB | Refills: 2 | OUTPATIENT
Start: 2018-10-31

## 2018-10-31 NOTE — TELEPHONE ENCOUNTER
Patient was supposed to be seen September 17 and does not have appointment with me. Please provide her with appointment and medication.

## 2018-11-04 RX ORDER — PAROXETINE HYDROCHLORIDE 20 MG/1
TABLET, FILM COATED ORAL
Qty: 30 TAB | Refills: 2 | Status: SHIPPED | OUTPATIENT
Start: 2018-11-04 | End: 2019-02-22 | Stop reason: SDUPTHER

## 2018-12-03 DIAGNOSIS — I10 ACCELERATED HYPERTENSION: ICD-10-CM

## 2018-12-03 RX ORDER — LOSARTAN POTASSIUM AND HYDROCHLOROTHIAZIDE 12.5; 5 MG/1; MG/1
TABLET ORAL
Qty: 30 TAB | Refills: 1 | Status: SHIPPED | OUTPATIENT
Start: 2018-12-03 | End: 2019-02-15 | Stop reason: SDUPTHER

## 2019-02-15 DIAGNOSIS — I10 ACCELERATED HYPERTENSION: ICD-10-CM

## 2019-02-18 RX ORDER — LOSARTAN POTASSIUM AND HYDROCHLOROTHIAZIDE 12.5; 5 MG/1; MG/1
1 TABLET ORAL DAILY
Qty: 30 TAB | Refills: 1 | Status: SHIPPED | OUTPATIENT
Start: 2019-02-18 | End: 2019-03-06 | Stop reason: SDUPTHER

## 2019-02-18 RX ORDER — PAROXETINE HYDROCHLORIDE 20 MG/1
TABLET, FILM COATED ORAL
Qty: 30 TAB | Refills: 2 | OUTPATIENT
Start: 2019-02-18

## 2019-02-22 RX ORDER — PAROXETINE HYDROCHLORIDE 20 MG/1
TABLET, FILM COATED ORAL
Qty: 30 TAB | Refills: 6 | Status: SHIPPED | OUTPATIENT
Start: 2019-02-22 | End: 2019-03-06 | Stop reason: SDUPTHER

## 2019-02-23 NOTE — PROGRESS NOTES
HISTORY OF Cj Gomes is a 46 y.o. female. HPI   Here for CPE and   F/u depression, hypothroidism, anemia--s/p total- hyst and b/l salpingectomy--[facility]--Dr Liu, HTN,      Dx with HTN since last OV and started on losartan/hct  tsh 11--levothyroxine increased to  125 mcg every day in August 2018 but taking 112 mcg dose now! Had renal duplex--severe SMA and celiac artery stenosis--referred to vascular MD but no appt------  Denies any abdominal pain  Saw  Dr Syed Garcia for MDD anxiety insomnia and was restarted on paxil which helps a lot per pt    Due for mammogram and colonscopy------  Had colonoscopy in [address] [facility] yrs ago    C/o fatigue and pain in all joints especially pain in feet    Did not see DERM MD for lesions on the forehead      Smoker 1ppd    Last OV  Last visit tsh 12--levothyroxine increased to 100mcg every day  lexapro was changed to wellbutrin and referral to taryn HUITRON , has appt July 17th  Stopped wellbutrin d/t lack improvement  Went to [facility] for implantable hormones  Lab labs at [facility] level was low  1 ppd tobacco smokers  3-4 shots of liquor at night to fall asleep     Has had itchy lesions on forehead recently           Patient Active Problem List    Diagnosis Date Noted    Iron deficiency anemia due to chronic blood loss 02/16/2017    Hypothyroidism 02/11/2013    Smoker 02/11/2013    Depression 02/11/2013    Anxiety 02/11/2013    AK (actinic keratosis) 02/11/2013     Current Outpatient Medications   Medication Sig Dispense Refill    PARoxetine (PAXIL) 20 mg tablet take 1 tablet by mouth once daily 30 Tab 6    losartan-hydroCHLOROthiazide (HYZAAR) 50-12.5 mg per tablet Take 1 Tab by mouth daily. 30 Tab 1    levothyroxine (SYNTHROID) 125 mcg tablet Take 1 Tab by mouth Daily (before breakfast). 30 Tab 4    zolpidem (AMBIEN) 5 mg tablet Take 1 Tab by mouth nightly as needed for Sleep. Max Daily Amount: 5 mg.  Indications: SLEEP-ONSET INSOMNIA 30 Tab 2    ibuprofen (ADVIL) 200 mg tablet Take 400 mg by mouth every six (6) hours as needed for Pain. No Known Allergies  Social History     Tobacco Use    Smoking status: Current Every Day Smoker     Packs/day: 1.00     Years: 30.00     Pack years: 30.00    Smokeless tobacco: Never Used   Substance Use Topics    Alcohol use: Yes     Comment: shot of liquor -drink daily      Lab Results   Component Value Date/Time    Glucose 80 08/06/2018 09:33 AM    Creatinine 0.57 08/06/2018 09:33 AM      No results found for: CHOL, CHOLPOCT, HDL, LDL, LDLC, LDLCPOC, LDLCEXT, TRIGL, TGLPOCT, CHHD, CHHDX  Lab Results   Component Value Date/Time    ALT (SGPT) 23 05/26/2017 08:23 AM    AST (SGOT) 20 05/26/2017 08:23 AM    Alk. phosphatase 72 05/26/2017 08:23 AM    Bilirubin, total 0.4 05/26/2017 08:23 AM    Albumin 3.5 05/26/2017 08:23 AM    Protein, total 6.7 05/26/2017 08:23 AM    PLATELET 774 87/22/7602 09:33 AM     Lab Results   Component Value Date/Time    GFR est non- 08/06/2018 09:33 AM    GFR est  08/06/2018 09:33 AM    Creatinine 0.57 08/06/2018 09:33 AM    BUN 8 08/06/2018 09:33 AM    Sodium 137 08/06/2018 09:33 AM    Potassium 4.3 08/06/2018 09:33 AM    Chloride 101 08/06/2018 09:33 AM    CO2 22 08/06/2018 09:33 AM     Lab Results   Component Value Date/Time    TSH 11.820 (H) 08/06/2018 09:33 AM    T4, Free 1.29 08/06/2018 09:33 AM         Review of Systems   Constitutional: Positive for malaise/fatigue and weight loss. Musculoskeletal: Positive for joint pain. Physical Exam   Constitutional: She appears well-developed and well-nourished. Appears stated age   HENT:   Head: Normocephalic. Eyes: Pupils are equal, round, and reactive to light. Neck: Normal range of motion. Neck supple. No JVD present. No tracheal deviation present. No thyromegaly present. Cardiovascular: Normal rate, regular rhythm and normal heart sounds. Exam reveals no gallop and no friction rub.    No murmur heard.  Pulmonary/Chest: Effort normal and breath sounds normal. No respiratory distress. She has no wheezes. Abdominal: Soft. Bowel sounds are normal. She exhibits no mass. There is no tenderness. There is no rebound and no guarding. Musculoskeletal: She exhibits no edema. Lymphadenopathy:     She has no cervical adenopathy. Neurological: She is alert. Skin:        Multiple AK on arms  Suspicious raised rash on right forehead   Psychiatric: She has a normal mood and affect. Nursing note and vitals reviewed. ASSESSMENT and PLAN  Diagnoses and all orders for this visit:    1. Essential hypertension  -     LIPID PANEL  -     METABOLIC PANEL, BASIC   Controlled on hyzaar  2. Hypothyroidism, unspecified type  -     TSH 3RD GENERATION    3. Major depressive disorder, remission status unspecified, unspecified whether recurrent   Controlled on zoloftr  4. Colon cancer screening   UTD per patient-colonoscopy in [address] [facility] 5 yrs ago  5. Breast screening  -     [facility] MAMMO BI SCREENING INCL CAD; Future    6. Acquired hypothyroidism  -     levothyroxine (SYNTHROID) 125 mcg tablet; Take 1 Tab by mouth Daily (before breakfast). BRAND ONLY  -     T4, FREE   Currently on 112 mcg dose of levothyroxinbe  7. Skin lesion of face  -     REFERRAL TO DERMATOLOGY    8. Arthralgia, unspecified joint  -     RHEUMATOID FACTOR, QL    9. Pain of foot, unspecified laterality  -     REFERRAL TO PODIATRY    10 CPE   Advised tobacco cessation   Mammogram ordered   Screening labs  Follow-up Disposition:  Return in about 6 months (around 8/25/2019) for htn and hypo thyroid.

## 2019-02-25 ENCOUNTER — OFFICE VISIT (OUTPATIENT)
Dept: INTERNAL MEDICINE CLINIC | Age: 49
End: 2019-02-25

## 2019-02-25 VITALS
HEART RATE: 72 BPM | HEIGHT: 62 IN | DIASTOLIC BLOOD PRESSURE: 78 MMHG | OXYGEN SATURATION: 97 % | TEMPERATURE: 97.8 F | SYSTOLIC BLOOD PRESSURE: 117 MMHG | BODY MASS INDEX: 28.71 KG/M2 | WEIGHT: 156 LBS

## 2019-02-25 DIAGNOSIS — I10 ESSENTIAL HYPERTENSION: ICD-10-CM

## 2019-02-25 DIAGNOSIS — F32.9 MAJOR DEPRESSIVE DISORDER, REMISSION STATUS UNSPECIFIED, UNSPECIFIED WHETHER RECURRENT: ICD-10-CM

## 2019-02-25 DIAGNOSIS — Z12.11 COLON CANCER SCREENING: ICD-10-CM

## 2019-02-25 DIAGNOSIS — Z12.39 BREAST SCREENING: ICD-10-CM

## 2019-02-25 DIAGNOSIS — E03.9 HYPOTHYROIDISM, UNSPECIFIED TYPE: ICD-10-CM

## 2019-02-25 DIAGNOSIS — Z00.00 ROUTINE GENERAL MEDICAL EXAMINATION AT A HEALTH CARE FACILITY: Primary | ICD-10-CM

## 2019-02-25 DIAGNOSIS — M79.673 PAIN OF FOOT, UNSPECIFIED LATERALITY: ICD-10-CM

## 2019-02-25 DIAGNOSIS — L98.9 SKIN LESION OF FACE: ICD-10-CM

## 2019-02-25 DIAGNOSIS — E03.9 ACQUIRED HYPOTHYROIDISM: ICD-10-CM

## 2019-02-25 DIAGNOSIS — M25.50 ARTHRALGIA, UNSPECIFIED JOINT: ICD-10-CM

## 2019-02-25 RX ORDER — LEVOTHYROXINE SODIUM 125 UG/1
125 TABLET ORAL
Qty: 30 TAB | Refills: 4 | Status: SHIPPED | OUTPATIENT
Start: 2019-02-25 | End: 2019-02-26 | Stop reason: DRUGHIGH

## 2019-02-25 RX ORDER — LEVOTHYROXINE SODIUM 125 UG/1
125 TABLET ORAL
Qty: 30 TAB | Refills: 4 | Status: SHIPPED | OUTPATIENT
Start: 2019-02-25 | End: 2019-02-25 | Stop reason: SDUPTHER

## 2019-02-25 NOTE — PROGRESS NOTES
Chief Complaint   Patient presents with    Complete Physical     annual    Joint Pain     all over body     Foot Pain     both

## 2019-02-26 LAB
BUN SERPL-MCNC: 9 MG/DL (ref 6–24)
BUN/CREAT SERPL: 19 (ref 9–23)
CALCIUM SERPL-MCNC: 9.2 MG/DL (ref 8.7–10.2)
CHLORIDE SERPL-SCNC: 102 MMOL/L (ref 96–106)
CHOLEST SERPL-MCNC: 174 MG/DL (ref 100–199)
CO2 SERPL-SCNC: 24 MMOL/L (ref 20–29)
CREAT SERPL-MCNC: 0.48 MG/DL (ref 0.57–1)
GLUCOSE SERPL-MCNC: 89 MG/DL (ref 65–99)
HDLC SERPL-MCNC: 49 MG/DL
LDLC SERPL CALC-MCNC: 99 MG/DL (ref 0–99)
POTASSIUM SERPL-SCNC: 4.3 MMOL/L (ref 3.5–5.2)
SODIUM SERPL-SCNC: 140 MMOL/L (ref 134–144)
T4 FREE SERPL-MCNC: 1.48 NG/DL (ref 0.82–1.77)
TRIGL SERPL-MCNC: 131 MG/DL (ref 0–149)
TSH SERPL DL<=0.005 MIU/L-ACNC: 0.61 UIU/ML (ref 0.45–4.5)
VLDLC SERPL CALC-MCNC: 26 MG/DL (ref 5–40)

## 2019-02-26 RX ORDER — LEVOTHYROXINE SODIUM 112 UG/1
112 TABLET ORAL
Qty: 30 TAB | Refills: 9 | Status: SHIPPED | OUTPATIENT
Start: 2019-02-26 | End: 2019-03-06 | Stop reason: SDUPTHER

## 2019-04-11 DIAGNOSIS — F51.01 PRIMARY INSOMNIA: Primary | ICD-10-CM

## 2019-04-11 NOTE — TELEPHONE ENCOUNTER
Pt requesting refill for Rx \"generic Ambien\" sent to [facility] on [address]. Best contact [phone].        Message received & copied from [facility]

## 2019-04-12 RX ORDER — ZOLPIDEM TARTRATE 5 MG/1
5 TABLET ORAL
Qty: 30 TAB | Refills: 5 | Status: SHIPPED | OUTPATIENT
Start: 2019-04-12 | End: 2019-04-13 | Stop reason: SDUPTHER

## 2019-04-12 NOTE — TELEPHONE ENCOUNTER
Requested Prescriptions     Signed Prescriptions Disp Refills    zolpidem (AMBIEN) 5 mg tablet 30 Tab 5     Sig: Take 1 Tab by mouth nightly as needed for Sleep. Max Daily Amount: 5 mg. Authorizing Provider: Rebecca Bishop     Phoned into [facility] pharmacy.

## 2019-04-13 DIAGNOSIS — F51.01 PRIMARY INSOMNIA: ICD-10-CM

## 2019-04-16 RX ORDER — ZOLPIDEM TARTRATE 5 MG/1
5 TABLET ORAL
Qty: 30 TAB | Refills: 5 | Status: SHIPPED | OUTPATIENT
Start: 2019-04-16 | End: 2019-12-06 | Stop reason: SDUPTHER

## 2019-04-17 ENCOUNTER — TELEPHONE (OUTPATIENT)
Dept: INTERNAL MEDICINE CLINIC | Age: 49
End: 2019-04-17

## 2019-06-21 DIAGNOSIS — I10 ACCELERATED HYPERTENSION: ICD-10-CM

## 2019-06-21 RX ORDER — LOSARTAN POTASSIUM AND HYDROCHLOROTHIAZIDE 12.5; 5 MG/1; MG/1
1 TABLET ORAL DAILY
Qty: 90 TAB | Refills: 3 | Status: SHIPPED | OUTPATIENT
Start: 2019-06-21 | End: 2020-06-11

## 2019-06-21 RX ORDER — PAROXETINE HYDROCHLORIDE 20 MG/1
TABLET, FILM COATED ORAL
Qty: 90 TAB | Refills: 3 | Status: SHIPPED | OUTPATIENT
Start: 2019-06-21 | End: 2020-02-06

## 2019-07-15 ENCOUNTER — TELEPHONE (OUTPATIENT)
Dept: DERMATOLOGY | Facility: AMBULATORY SURGERY CENTER | Age: 49
End: 2019-07-15

## 2019-07-15 NOTE — TELEPHONE ENCOUNTER
Made phone call to patient, verified patient with two identifiers, name and date of birth. [facility] Pre-Op Assessment    Patient Appointment Date: 8/1/19 and 8/12/19 @ [address] [place] [address], 46 y.o., female      does confirm site: Left [id number] and Right forehead-BCC  Brief description of tumor: visible  does ID site. (Can they still visibly see the site)  does not have Hepatitis C   does not have HIV (If YES, set up consult appointment)    Allergies:  No Known Allergies    does not have an Electrical Implanted Device (Pacemaker, AICD, brain stimulator, etc.)    does not need antibiotics  If yes, antibiotic used:n/a; and needs it because n/a (valve replacement, etc.)  Can patient get antibiotic from primary care provider NO    is taking NSAIDs  If yes, is taking Advil, and was asked to d/c 2 days prior to surgery and informed to resume taking 2 days after surgery. Patient can switch to a Tylenol based medication. is not taking aspirin  If yes, is taking because of n/a (i.e. heart attack, stroke, TIA, bypass surgery, etc.)    is not taking Garlic  is not taking Ginkgo  is not taking Ginseng  is not taking Fish oils  is not taking Vit E    does not take a blood thinner(i.e. Coumadin/Warfarin, Plavix, Brilinta, Pradaxa, Xarelto, Effient, Eliquis, Savaysa)  If taking Coumadin needs to have PT/INR drawn and faxed results within a week of surgery    does not have a blood disorder (CLL, AML, PV)  is not on Meds for blood disorder, pt on:  is not on Chemo for blood disorder    has not had a organ transplant  has not had a bone marrow transplant      Pre operative assessment questions asked to patient. Patient has a general understanding of the procedure, and has been versed that there will be local anesthesia used in the procedure and that She will be ok to drive themselves to and from the appointment. Patient has been notified to arrive 15-20 minutes early and they may eat or drink before arriving.

## 2019-07-20 NOTE — MR AVS SNAPSHOT
Visit Information Date & Time Provider Department Dept. Phone Encounter #  
 12/20/2017  8:45 AM Gita Blancas, [address] [place] [address],[address] [phone] [id number] Follow-up Instructions Return in about 3 months (around 3/20/2018) for f/u thyroid. Upcoming Health Maintenance Date Due Pneumococcal 19-64 Medium Risk (1 of 1 - PPSV23) 12/5/1987 DTaP/Tdap/Td series (1 - Tdap) 12/5/1989 PAP AKA CERVICAL CYTOLOGY 12/20/2020 Allergies as of 12/20/2017  Review Complete On: 6/20/2017 By: Gita Blancas MD  
 No Known Allergies Current Immunizations  Reviewed on 4/27/2017 No immunizations on file. Not reviewed this visit You Were Diagnosed With   
  
 Codes Comments Hypothyroidism, unspecified type    -  Primary ICD-10-CM: E03.9 ICD-9-CM: 244.9 Iron deficiency anemia due to chronic blood loss     ICD-10-CM: D50.0 ICD-9-CM: 280.0 Diarrhea, unspecified type     ICD-10-CM: R19.7 ICD-9-CM: 787.91 Vitals BP Pulse Temp Height(growth percentile) Weight(growth percentile) LMP  
 135/84 (BP 1 Location: Left arm, BP Patient Position: Sitting) 95 98.3 °F (36.8 °C) (Oral) 5' 2\" (1.575 m) 142 lb 3.2 oz (64.5 kg) 05/22/2017 SpO2 BMI OB Status Smoking Status 98% 26.01 kg/m2 Hysterectomy Current Every Day Smoker BMI and BSA Data Body Mass Index Body Surface Area 26.01 kg/m 2 1.68 m 2 Preferred Pharmacy Pharmacy Name Phone [id number] [place] [address] [address] [phone] Your Updated Medication List  
  
   
This list is accurate as of: 12/20/17  9:29 AM.  Always use your most recent med list. ADVIL 200 mg tablet Generic drug:  ibuprofen Take 400 mg by mouth every six (6) hours as needed for Pain.  
  
 escitalopram oxalate 20 mg tablet Commonly known as:  Sabrina Levo Take 1 Tab by mouth daily. levothyroxine 88 mcg tablet Commonly known as:  SYNTHROID Take 1 Tab by mouth Daily (before breakfast). liothyronine 25 mcg tablet Commonly known as:  CYTOMEL Take 1 Tab by mouth daily. Prescriptions Sent to Pharmacy Refills  
 escitalopram oxalate (LEXAPRO) 20 mg tablet 11 Sig: Take 1 Tab by mouth daily. Class: Normal  
 Pharmacy: [id number]- [address]  #: [phone] Route: Oral  
  
We Performed the Following C DIFFICILE TOXIN A & B BY EIA [68948 CPT(R)] CBC W/O DIFF [91961 CPT(R)] CELIAC ANTIBODY PROFILE [MXI38083 Custom] REFERRAL TO GASTROENTEROLOGY [NML16 Custom] T4, FREE Z2910802 CPT(R)] TSH 3RD GENERATION [87263 CPT(R)] Follow-up Instructions Return in about 3 months (around 3/20/2018) for f/u thyroid. Referral Information Referral ID Referred By Referred To  
  
 [id number] [place], [address] [place] [address], [address] [facility] Visits Status Start Date End Date 1 New Request 12/20/17 12/20/18 If your referral has a status of pending review or denied, additional information will be sent to support the outcome of this decision. Introducing [facility] & HEALTH SERVICES! Dear [place]: 
Thank you for requesting a [employer] account. Our records indicate that you already have an active [employer] account. You can access your account anytime at [email or web address] Did you know that you can access your hospital and ER discharge instructions at any time in [employer]? You can also review all of your test results from your hospital stay or ER visit. Additional Information If you have questions, please visit the Frequently Asked Questions section of the [employer] website at [email or web address] Remember, [employer] is NOT to be used for urgent needs. For medical emergencies, dial 911. Now available from your iPhone and Android! Please provide this summary of care documentation to your next provider. Your primary care clinician is listed as Júnior. Addy NOBLES. If you have any questions after today's visit, please call [phone]. No

## 2019-07-23 ENCOUNTER — OFFICE VISIT (OUTPATIENT)
Dept: DERMATOLOGY | Facility: AMBULATORY SURGERY CENTER | Age: 49
End: 2019-07-23

## 2019-07-23 VITALS
OXYGEN SATURATION: 96 % | BODY MASS INDEX: 28.71 KG/M2 | SYSTOLIC BLOOD PRESSURE: 132 MMHG | WEIGHT: 156 LBS | HEIGHT: 62 IN | HEART RATE: 91 BPM | DIASTOLIC BLOOD PRESSURE: 86 MMHG

## 2019-07-23 DIAGNOSIS — D04.39 SQUAMOUS CELL CARCINOMA IN SITU (SCCIS) OF SKIN OF LEFT TEMPLE REGION: ICD-10-CM

## 2019-07-23 DIAGNOSIS — C44.319 BASAL CELL CARCINOMA (BCC) OF RIGHT FOREHEAD: Primary | ICD-10-CM

## 2019-07-24 NOTE — PROGRESS NOTES
Rebecca Rodriguez is a 46 y.o. female presents to the office today with the following:  Chief Complaint   Patient presents with    New Patient     Consult-MOHS left and right forhead       No Known Allergies  Current Outpatient Medications   Medication Sig    cyanocobalamin, vitamin B-12, (VITAMIN B12 PO) Take  by mouth.  losartan-hydroCHLOROthiazide (HYZAAR) 50-12.5 mg per tablet Take 1 Tab by mouth daily.  PARoxetine (PAXIL) 20 mg tablet take 1 tablet by mouth once daily    levothyroxine (SYNTHROID) 112 mcg tablet Take 1 Tab by mouth Daily (before breakfast). BRAND ONLY    glucosam-chond-hyalu-CF borate (MOVE FREE gdgt) 750 mg-100 mg- 1.65 mg-108 mg tab Take  by mouth.  zolpidem (AMBIEN) 5 mg tablet Take 1 Tab by mouth nightly as needed for Sleep. Max Daily Amount: 5 mg. Indications: SLEEP-ONSET INSOMNIA    zolpidem (AMBIEN) 5 mg tablet Take 1 Tab by mouth nightly as needed for Sleep. Max Daily Amount: 5 mg.  ibuprofen (ADVIL) 200 mg tablet Take 400 mg by mouth every six (6) hours as needed for Pain. No current facility-administered medications for this visit.         Past Medical History:   Diagnosis Date    Anemia     Depression     Graves disease     Hepatitis A age 10    treated   [place] History of blood transfusion 04/27/2017    2 units PRBC    Menorrhagia     Skin cancer      Past Surgical History:   Procedure Laterality Date    HX COLONOSCOPY      HX DILATION AND CURETTAGE  05/18/2017     Social History     Socioeconomic History    Marital status: SINGLE     Spouse name: Not on file    Number of children: Not on file    Years of education: Not on file    Highest education level: Not on file   Tobacco Use    Smoking status: Current Every Day Smoker     Packs/day: 1.00     Years: 30.00     Pack years: 30.00    Smokeless tobacco: Never Used   Substance and Sexual Activity    Alcohol use: Yes     Frequency: 4 or more times a week     Drinks per session: 1 or 2     Comment: shot of liquor -drink daily    Drug use: Yes     Types: Prescription, OTC    Sexual activity: Yes     Partners: Male     Birth control/protection: Surgical     Family History   Problem Relation Age of Onset    Cancer Mother         thyroid cancer and depression    Psychiatric Disorder Mother     Alcohol abuse Mother     Alcohol abuse Father     Diabetes Brother     Heart Disease Brother           59-year-old  female presents for consultation prior to undergoing Mohs surgery to treat a biopsy-proven basal cell carcinoma of the right forehead and a 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 These lesions were recently biopsied by Leah Prieto MD.  The patient notes that the lesion on the left temple is moderately tender and has seemed to grow back after biopsy. She reports that both lesions have been present for at least 5 years. They have never been treated in the past.  These are her first diagnosed skin cancers, however she has chemo wraps on her arms today for precancers that were applied by Dr. Judy Pittman office. She does not take any antiplatelet or anticoagulant medications. She does not have a pacemaker defibrillator or any artificial joints or heart valves. She does not have hepatitis C or HIV. She does not have allergies to any antibiotics or lidocaine. She is a 1 pack-a-day smoker. Review of Systems   Constitutional: Negative for chills, fever and weight loss. Eyes: Negative for blurred vision. Respiratory: Negative for shortness of breath. Cardiovascular: Negative for chest pain and leg swelling. Gastrointestinal: Negative for diarrhea, nausea and vomiting. Musculoskeletal: Negative for joint pain. Neurological: Positive for headaches. Endo/Heme/Allergies: Does not bruise/bleed easily.          Visit Vitals  /86 (BP 1 Location: Right arm, BP Patient Position: Sitting)   Pulse 91   Ht 5' 2\" (1.575 m)   Wt 70.8 kg (156 lb)   LMP 05/22/2017 SpO2 96%   BMI 28.53 kg/m²     Physical Exam   Constitutional: She is oriented to person, place, and time and well-developed, well-nourished, and in no distress. HENT:   Head: Normocephalic. Eyes: EOM are normal.   Pulmonary/Chest: Effort normal.   Lymphadenopathy:     She has no cervical adenopathy. Neurological: She is alert and oriented to person, place, and time. Skin:   On the right forehead there is a large ill-defined sclerotic pearly plaque. On the left temple at the temporal hairline there is a scaly keratotic plaque. Sensation is intact on the scalp superior to both lesions. Motor function is intact bilaterally. There is minimal to moderate laxity of the forehead. There is moderate laxity of the left temple. Orders Placed This Encounter    cyanocobalamin, vitamin B-12, (VITAMIN B12 PO)     Sig: Take  by mouth. 1. Basal cell carcinoma (BCC) of right forehead    Given the size, location and indistinct clinical margins of the tumor the appropriate treatment is Mohs micrographic surgery. I discussed the procedure with the patient today including the risks such as bleeding, scar, infection, damage to underlying sensory/motor nerves and blood vessels. We discussed expectations for day of surgery, immediate postop recovery and long-term scar maturation. I outlined the clinical margins of the tumor to show the patient in the mirror, and the patient agrees that the appropriate site is identified. I also discussed potential repair options including bilateral advancement flap, graft, second intention, combination repair, staged repair. We discussed the immediate postop period and the potential for developing postoperative edema and ecchymosis. Additional reconstructive options that were discussed included referral to facial plastics. All of the patient's questions were answered today and I am happy to speak via telephone regarding any additional questions.     2. 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 I discussed the procedure with the patient today including the risks such as bleeding, scar, infection, damage to underlying sensory/motor nerves and blood vessels. We discussed expectations for day of surgery, immediate postop recovery and long-term scar maturation. I outlined the clinical margins of the tumor to show the patient in the mirror, and the patient agrees that the appropriate site is identified. I also discussed potential repair options including rotation flap, skin graft, second intention healing, transposition flap. We discussed the immediate postop period and the potential for developing postoperative ecchymosis and edema as well as motor nerve palsy. Additional reconstructive options that were discussed included referral to facial plastics. All of the patient's questions were answered today and I am happy to speak via telephone regarding any additional questions. Follow-up and Dispositions    · Return in about 2 weeks (around 8/6/2019) for Mohs.          Augusta Miller MD

## 2019-08-01 ENCOUNTER — OFFICE VISIT (OUTPATIENT)
Dept: DERMATOLOGY | Facility: AMBULATORY SURGERY CENTER | Age: 49
End: 2019-08-01

## 2019-08-01 VITALS
HEIGHT: 62 IN | RESPIRATION RATE: 16 BRPM | WEIGHT: 156 LBS | DIASTOLIC BLOOD PRESSURE: 76 MMHG | OXYGEN SATURATION: 97 % | SYSTOLIC BLOOD PRESSURE: 124 MMHG | BODY MASS INDEX: 28.71 KG/M2 | HEART RATE: 79 BPM | TEMPERATURE: 98.6 F

## 2019-08-01 DIAGNOSIS — C44.329 SQUAMOUS CELL CARCINOMA OF SKIN OF LEFT TEMPLE: Primary | ICD-10-CM

## 2019-08-01 RX ORDER — OXYCODONE AND ACETAMINOPHEN 5; 325 MG/1; MG/1
1 TABLET ORAL
Qty: 10 TAB | Refills: 0 | Status: SHIPPED | OUTPATIENT
Start: 2019-08-01 | End: 2019-08-04

## 2019-08-01 RX ORDER — CEPHALEXIN 500 MG/1
500 CAPSULE ORAL 3 TIMES DAILY
Qty: 21 CAP | Refills: 0 | Status: SHIPPED | OUTPATIENT
Start: 2019-08-01 | End: 2019-08-08

## 2019-08-01 NOTE — PATIENT INSTRUCTIONS
Chief Complaint   Patient presents with    Surgery     Mohs     WOUND CARE INSTRUCTIONS     1. Keep the dressing clean and dry and do not remove for 48 hours. 2. Then change the dressing once a day as follows:  a. Wash hands before and after each dressing change. b. Remove dressing and wash site gently with mild soap and water, rinse, and pat dry.  c. Apply liberal amounts of an ointment (Petroleum jelly or Aquaphor). d. Apply a non-stick (Telfa) dressing or Band-Aid to cover the wound. 3. Watch for:  BLEEDING: A small amount of drainage may occur. If bleeding occurs, elevate and rest the surgery site. Apply gauze and steady pressure for 20 minutes. If bleeding continues repeat pressure once more. If bleeding still does not stop, call this office. If after hours, call Dr. Fatemeh Aguero at [phone]. INFECTION: Signs of infection include increased redness, pain, warmth, drainage of pus, and fever. If this occurs, please call this office. 4. Special Instructions (follow any that are checked):  · [x] You have stitches that DO need to be removed. · [x] Avoid bending at the waist and heavy lifting for two days. · [x] Sleep with your head elevated for the next two nights. · [] Rest the surgery site and keep it elevated as much as possible for two days. · [x] You may apply an ice-pack for 10-15 minutes every waking hour for the rest of the day. · [] Eat a soft diet and avoid hot food and hot drinks for the rest of the day. · [] Other instructions: Follow up as directed. Take Tylenol for pain as needed. Once the site is healed with no remaining bandages or open areas, protect your surgical site and scar from the sun, as this area will be more sensitive. Use a broad spectrum sunscreen SPF 30 or higher daily, and a chemical free product (one containing zinc oxide or titanium dioxide) is a good choice if the area is sensitive.     You may begin to gently massage the surgical site in 3 weeks, rubbing in a circular motion along the scar. This can help reduce swelling and thickness of a scar. If you have any questions or concerns, please call our office Monday through Friday at [phone]. If after hours, please call Dr. Fatemeh Aguero at [phone].

## 2019-08-01 NOTE — LETTER
8/1/2019 3:23 PM 
 
Patient:  Cole Ayon YOB: 1970 Date of Visit: 8/1/2019 Dear Benjamin Heaton MD 
[address] [place] [address] [address] [facility] [phone] [id number] VIA Facsimile: [phone] Thank you for referring Cole Ayon to me for evaluation/treatment. Below are the relevant portions of my assessment and plan of care. Ms. 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 Of note, I found invasive squamous cell carcinoma on my debulking specimen. 2 stage(s) of Mohs surgery were required to achieve tumor free margins. I repaired the defect with a(n) bilateral rotation flap. She tolerated the procedure well. Please see the attached procedure note(s) for additional details. Ms. Agueda Meredith will return to me for suture removal and/or wound checks at an appropriate interval and I will follow-up with her regarding any issues arising from or relating to this surgery. I will otherwise defer any additional dermatologic care back to you. If you have questions, please do not hesitate to call me. I look forward to following Ms. Agueda Meredith along with you. Sincerely, Ronalee Epley, MD 
[phone] (cell)

## 2019-08-01 NOTE — PROGRESS NOTES
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 year old female referred by  Lawyer Norberto HUITRON for Mohs surgery to treat the following lesion:  Lesion Info  Location: Left forehead  Size: 2.2 x 1.9 cm  Type: Squamous in situ  Duration: 2-5 years  Path Lab: [address] [place] [address] #: [id number]  Prior Treatment: none     Symptoms of the lesion include pain and growth. ROS:  [place] is feeling well and in their usual state of health today. She is not in pain. She does not have any other skin concerns. Exam:  [place] is an awake, alert, oriented, well-appearing female in no distress. There is not preauricular, submandibular, or cervical lymphadenopathy. The face was examined. Findings are:  On the left temple there is a greater than 2 cm scaly plaque. 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 The diagnosis was reviewed. The Mohs surgery procedure was reviewed. Indications, risks, and options were discussed with MsPalmira Cam preoperatively. Risks including, but not limited to: pain, bleeding, infection, tumor recurrence, scarring and damage to motor and/or sensory nerves, were discussed. Ms. Rani Miller chose Mohs surgery. Ms. Rani Miller was an acceptable surgery candidate. I performed Mohs surgery using standard technique after verbal and written consent were obtained. The lesion was identified and confirmed with the patient and photograph, if available. The surgical site was marked with gentian violet, prepped, draped and anesthetized in standard fashion. The tumor was debulked by curettage and orientation hashes were placed. The tumor and any associated scar was excised using beveled incision. Hemostasis was achieved, the site was bandaged, and the tissue was transported to the Mohs lab.   While maintaining anatomic orientation the tissue was divided, if needed, and marked with colored inks that were noted on the corresponding Mohs map. The tissue was prepared by Mohs en-face technique for fresh frozen section analysis. The resulting slides were examined for residual tumor, scar and other concerns, all of which were marked on the corresponding Mohs map, if present. The Mohs map was used to guide subsequent stages of surgery, if necessary, and the above process was repeated until a tumor-free plane was achieved. Once the tumor was cleared the map was marked as such and signed. Dr. Giovana Jimenez acted as surgeon and pathologist for the entire case, performing all stages of the surgical excision as well as examination and interpretation of the histologic slides. See table below for details regarding the surgical case. 2 stage(s) were required to reach a tumor-free plane, resulting in a 3.0 x 2.5  defect extending to the subcutaneous fat. There were not complications. [place] will follow up as needed in the postoperative period. Regular skin examinations will be with  Chintan Nguyen MD.    The wound management options of second intent healing, layered closure, local flap, or full thickness skin graft were discussed. Ms. Julissa Condon understands the aims, risks, alternatives, and possible complications and elects to proceed with a rotation flap. Ms. Julissa Condon was placed in a supine position on the operating table in the Mohs surgery procedure room. The area was prepped and draped in the standard manner. Gentian violet was used to outline the proposed flap.  1% lidocaine with epinephrine 1:100,000 was used to supplement the already existing anesthesia. The wound penetrated to the subcutaneous fat layer and measured 3.0 x 2.5 cm. The wound margins were debeveled and the flap was incised using a #15 blade held perpendicularly to the skin surface. The flap and defect margins were widely undermined in the subcutaneous plane. Hemostasis was obtained with spot electrocoagulation.   The flap was rotated into place. 4-0 vicryl buried vertical mattress sutures were used to approximate the deep tissues and dermis. 5-0 prolene and 6-0 prolene epidermal sutures were used to approximate the skin edges with careful attention to apposition and eversion. The final flap measures 10.2 x 6.6 cm. A pressure dressing utilizing Petrolatum ointment, Telfa, gauze and Coverroll was placed. Wound care instructions (written and/or verbal) and a follow up appointment were given to the patient before discharge. Ms. Barry Lang was discharged in good condition. Left forehead  Mohs Lesion Operative Report  Date: 08/01/19  Room: procedure room 2  Indications: Poor definition, Site, Size  Pre-op Meds: none  Pre-op BP: 122/76  Pre-op pulse: 91  1st Assistant: Ivanna Cueto Rn  Stage #: 2  Stage 1 Sections: 1  Stage 1 # Pos: 1  Stage 2 Sections: 1  Stage 2 # POS: 0  Perineural Involvement: No  Lymphadenopathy: No  Defect Size: 3.0 x 2.5   Depth: subcutaneous fat  Wound Mgt: rotation flap  Donor Site: n/a  Suture: Buried, Surface  Buried details: 4-0 vicryl  Surface Details: 5-0 prolene  Undermining: SubQ  Closure Length: 10.2 x 6.6 cm  Estimated Blood Loss: 2 mL  Hemostasis: Electrosurgery, Pressure  Anesthesia: 1% Lidocaine w/1:100,000 epi  1% Lidocaine: 16 cc  Complications: none  Dressing: vaseline, telfa, guaze, medipore tape  Post-op BP: 122/76  Post-op Pulse: 79  Pos-op Meds: Cephalexan 500 mg, 1 tab PO 3 times daily x 7 days;  Oxycodone-acetaminophen 5-325 mg, 1 tab PO Q6H PRN for pain up to 3 days  W/C Instructions: Verbal, Written  Follow-up: on 8/12/19 for suture removal and wound check, plus Mohs on Right forehead     [facility]   OFFICE PROCEDURE PROGRESS NOTE   Chart reviewed for the following:   Estefanía Ortiz MD have reviewed the History, Physical and updated the Allergic reactions for [address] performed immediately prior to start of procedure: Estefanía Ortiz MD, have performed the following reviews on [place]   prior to the start of the procedure:     * Patient was identified by name and date of birth   * Agreement on procedure being performed was verified   * Risks and Benefits explained to the patient   * Procedure site verified and marked as necessary   * Patient was positioned for comfort   * Consent was signed and verified     Time: 8:15 AM  Date of procedure: 8/1/2019  Procedure performed by:  Carlo Curran MD  Provider assisted by: Ivanna Cueto Rn  Patient assisted by: self   How tolerated by patient: tolerated the procedure well with no complications   Comments: none

## 2019-08-12 ENCOUNTER — OFFICE VISIT (OUTPATIENT)
Dept: DERMATOLOGY | Facility: AMBULATORY SURGERY CENTER | Age: 49
End: 2019-08-12

## 2019-08-12 VITALS
SYSTOLIC BLOOD PRESSURE: 128 MMHG | WEIGHT: 156 LBS | HEIGHT: 62 IN | TEMPERATURE: 98.1 F | HEART RATE: 63 BPM | DIASTOLIC BLOOD PRESSURE: 68 MMHG | BODY MASS INDEX: 28.71 KG/M2 | RESPIRATION RATE: 15 BRPM

## 2019-08-12 DIAGNOSIS — C44.319 BASAL CELL CARCINOMA (BCC) OF RIGHT FOREHEAD: Primary | ICD-10-CM

## 2019-08-12 RX ORDER — OXYCODONE AND ACETAMINOPHEN 5; 325 MG/1; MG/1
1 TABLET ORAL
Qty: 10 TAB | Refills: 0 | Status: SHIPPED | OUTPATIENT
Start: 2019-08-12 | End: 2019-08-15

## 2019-08-12 RX ORDER — CEPHALEXIN 500 MG/1
500 CAPSULE ORAL 3 TIMES DAILY
Qty: 21 CAP | Refills: 0 | Status: SHIPPED | OUTPATIENT
Start: 2019-08-12 | End: 2019-08-19

## 2019-08-12 NOTE — PATIENT INSTRUCTIONS
Chief Complaint   Patient presents with    Surgery     Mohs - Right forehead, BCC     WOUND CARE INSTRUCTIONS     1. Keep the dressing clean and dry and do not remove for 48 hours. 2. Then change the dressing once a day as follows:  a. Wash hands before and after each dressing change. b. Remove dressing and wash site gently with mild soap and water, rinse, and pat dry.  c. Apply liberal amounts of an ointment (Petroleum jelly or Aquaphor). d. Apply a non-stick (Telfa) dressing or Band-Aid to cover the wound. 3. Watch for:  BLEEDING: A small amount of drainage may occur. If bleeding occurs, elevate and rest the surgery site. Apply gauze and steady pressure for 20 minutes. If bleeding continues repeat pressure once more. If bleeding still does not stop, call this office. If after hours, call Dr. Fatemeh Aguero at [phone]. INFECTION: Signs of infection include increased redness, pain, warmth, drainage of pus, and fever. If this occurs, please call this office. 4. Special Instructions (follow any that are checked):  · [x] You have stitches that DO need to be removed. · [x] Avoid bending at the waist and heavy lifting for two days. · [x] Sleep with your head elevated for the next two nights. · [] Rest the surgery site and keep it elevated as much as possible for two days. · [] You may apply an ice-pack for 10-15 minutes every waking hour for the rest of the day. · [] Eat a soft diet and avoid hot food and hot drinks for the rest of the day. · [] Other instructions: Follow up as directed. Take Tylenol OR prescribed pain med for pain as needed. Once the site is healed with no remaining bandages or open areas, protect your surgical site and scar from the sun, as this area will be more sensitive. Use a broad spectrum sunscreen SPF 30 or higher daily, and a chemical free product (one containing zinc oxide or titanium dioxide) is a good choice if the area is sensitive.     You may begin to gently massage the surgical site in 3 weeks, rubbing in a circular motion along the scar. This can help reduce swelling and thickness of a scar. If you have any questions or concerns, please call our office Monday through Friday at [phone]. If after hours, please call Dr. Jim Bedolla at [phone].

## 2019-08-12 NOTE — PROGRESS NOTES
Progress note for Mohs surgery patient:    Chief Complaint:  Basal cell carcinoma of the Right forehead    HPI:  Kenny Vargas is a 46y.o. year old female referred by  Elmer Up MD for Mohs surgery to treat the following lesion:  Lesion Info  Location: Right forehead  Size: 2.8 x 2.0 cm  Type: Basal  Duration: several years  Path Lab: [facility]  Path #: [id number]  Prior Treatment: none     Symptoms of the lesion include increase in size. ROS:  [place] is feeling well and in their usual state of health today. She is not in pain. She does not have any other skin concerns. Exam:  [place] is an awake, alert, oriented, well-appearing female in no distress. There is not preauricular, submandibular, or cervical lymphadenopathy. The face was examined. Findings are:  On the right forehead there is a large sclerotic telangiectatic plaque with a scar at the center. A/P:  Basal cell carcinoma of the Right forehead. The diagnosis was reviewed. The Mohs surgery procedure was reviewed. Indications, risks, and options were discussed with Ms. Levy preoperatively. Risks including, but not limited to: pain, bleeding, infection, tumor recurrence, scarring and damage to motor and/or sensory nerves, were discussed. Ms. Russ Courtney chose Mohs surgery. Ms. Russ Courtney was an acceptable surgery candidate. I performed Mohs surgery using standard technique after verbal and written consent were obtained. The lesion was identified and confirmed with the patient and photograph, if available. The surgical site was marked with gentian violet, prepped, draped and anesthetized in standard fashion. The tumor was debulked by curettage and orientation hashes were placed. The tumor and any associated scar was excised using beveled incision. Hemostasis was achieved, the site was bandaged, and the tissue was transported to the Mohs lab.   While maintaining anatomic orientation the tissue was divided, if needed, and marked with colored inks that were noted on the corresponding Mohs map. The tissue was prepared by Mohs en-face technique for fresh frozen section analysis. The resulting slides were examined for residual tumor, scar and other concerns, all of which were marked on the corresponding Mohs map, if present. The Mohs map was used to guide subsequent stages of surgery, if necessary, and the above process was repeated until a tumor-free plane was achieved. Once the tumor was cleared the map was marked as such and signed. Dr. Giovana Jimenez acted as surgeon and pathologist for the entire case, performing all stages of the surgical excision as well as examination and interpretation of the histologic slides. See table below for details regarding the surgical case. 2 stage(s) were required to reach a tumor-free plane, resulting in a 3.4 x 2.5 cm defect extending to the subcutaneous fat. There were not complications. [place] will follow up as needed in the postoperative period. Regular skin examinations will be with  Chintan Nguyen MD.      The wound management options of second intent healing, layered closure, local flap, or full thickness skin graft were discussed. Ms. Julissa Condon understands the aims, risks, alternatives, and possible complications and elects to proceed with a bilateral advancement flap. Ms. Julissa Condon was placed in a supine position on the operating table in the Mohs surgery procedure room. The area was prepped and draped in the standard manner. Gentian violet was used to outline the proposed flap.  1% lidocaine with epinephrine 1:100,000 was used to supplement the already existing anesthesia. The wound penetrated to the subcutaneous fat layer and measured 3.4 x 2.5 cm. The wound margins were debeveled and the flap was incised using a #15 blade held perpendicularly to the skin surface. The flap and defect margins were widely undermined in the subcutaneous plane.   Hemostasis was obtained with spot electrocoagulation. The flap was advanced into place. Because insufficient laxity was obtained from the temple and medial forehead, the superior standing cone was incised through subcutaneous fat and advanced inferiorly on a sling of frontalis muscle. 5-0 monocryl and 4-0 vicryl buried vertical mattress sutures were used to approximate the deep tissues and dermis. 5-0 prolene and 6-0 prolene epidermal sutures were used to approximate the skin edges with careful attention to apposition and eversion. The final flap measures 8.5 x 5.9 cm. A pressure dressing utilizing Petrolatum ointment, Telfa, gauze and Coverroll was placed. Wound care instructions (written and/or verbal) and a follow up appointment were given to the patient before discharge. Ms. Radha Perez was discharged in good condition.                 Right forehead  Mohs Lesion Operative Report  Date: 08/12/19  Room: procedure rooms 1 and 2  Indications: Poor definition, Site, Size  Pre-op Meds: n/a  Pre-op BP: 122/78  Pre-op pulse: 87  1st Assistant: Grant Diaz RN and Thuy Michaud LPN  Stage #: 2  Stage 1 Sections: 2  Stage 1 # Pos: 1  Stage 2 Sections: 1  Stage 2 # POS: 0  Perineural Involvement: No  Lymphadenopathy: No  Defect Size: 3.4 x 2.5 cm  Depth: subcutaneous fat  Wound Mgt: advancement flap  Donor Site: n/a  Suture: Buried, Surface  Buried details: 5-0 monocryl, 4-0 vicryl  Surface Details: 5-0 prolene  Undermining: SubQ  Closure Length: 8.5 x 5.9 cm  Estimated Blood Loss: 2 mL  Hemostasis: Electrosurgery, Pressure  Anesthesia: 1% Lidocaine w/1:100,000 epi  1% Lidocaine: 18 cc  Complications: n/a  Dressing: vaseline, telfa, gauze, medipore tape  Post-op BP: 128/68  Post-op Pulse: 63  Pos-op Meds: Cephalexin 500 mg, 1 cap PO 3 times daily for 7 days; oxycodone-acetaminophen 5-325 mg, 1 tab PO Q6H PRN for pain up to 3 days  W/C Instructions: Verbal, Written  Follow-up: 1 for suture removal and wound check     [facility]   OFFICE PROCEDURE PROGRESS NOTE   Chart reviewed for the following:   Sparkle Denney MD have reviewed the History, Physical and updated the Allergic reactions for [address] performed immediately prior to start of procedure:   Sparkle Denney MD, have performed the following reviews on Miroslava Garcia   prior to the start of the procedure:     * Patient was identified by name and date of birth   * Agreement on procedure being performed was verified   * Risks and Benefits explained to the patient   * Procedure site verified and marked as necessary   * Patient was positioned for comfort   * Consent was signed and verified     Time: 8 AM  Date of procedure: 8/12/2019  Procedure performed by:  Jessica Anderson MD  Provider assisted by: Harlan Carvajal RN and Severiano Feeler, LPN  Patient assisted by: self   How tolerated by patient: tolerated the procedure well with no complications   Comments: none

## 2019-08-12 NOTE — LETTER
8/12/2019 5:49 PM 
 
Patient:  Wilfrido Herndon YOB: 1970 Date of Visit: 8/12/2019 Dear Andree Montes De Oca MD 
[address] [place] [address] [address] [address] VIA Facsimile: [phone] Thank you for referring Wilfrido Herndon to me for evaluation/treatment. Below are the relevant portions of my assessment and plan of care. Ms. Pippa Izaguirre presented today for Mohs surgery to treat a biopsy-proven basal cell carcinoma of the right forehead. 2 stage(s) of Mohs surgery were required to achieve tumor free margins. I repaired the defect with a(n) bilateral advancement flap with a V to [employer] flap. She tolerated the procedure well. Please see the attached procedure note(s) for additional details. Ms. Pippa Izaguirre will return to me for suture removal and/or wound checks at an appropriate interval and I will follow-up with her regarding any issues arising from or relating to this surgery. I will otherwise defer any additional dermatologic care back to you. If you have questions, please do not hesitate to call me. I look forward to following Ms. Pippa Izaguirre along with you. Sincerely, Talita Wallace MD 
[phone] (cell)

## 2019-08-19 ENCOUNTER — OFFICE VISIT (OUTPATIENT)
Dept: DERMATOLOGY | Facility: AMBULATORY SURGERY CENTER | Age: 49
End: 2019-08-19

## 2019-08-19 DIAGNOSIS — C44.329 SQUAMOUS CELL CARCINOMA OF SKIN OF LEFT TEMPLE: ICD-10-CM

## 2019-08-19 DIAGNOSIS — C44.319 BASAL CELL CARCINOMA (BCC) OF RIGHT FOREHEAD: Primary | ICD-10-CM

## 2019-08-19 NOTE — PROGRESS NOTES
Wound check/suture removal:    Chief complaint: wound check. HPI: Estefany Bennett presents for wound check following Mohs surgery to treat a large biopsy-proven basal cell carcinoma of the right forehead repaired with a combination of advancement and island pedicle flaps as well as a squamous cell carcinoma of the left forehead repaired with rotation flaps performed 1 and 3 weeks ago, respectively. Since that time the patient is continued to smoke approximately 1/2 packs of cigarettes per day. She otherwise has not had problems with her surgical sites, although she complains of the eyelid edema. Exam: The surgical site was examined. There is not evidence of infection. There is erythema. There is edema. There is partial-thickness flap tip necrosis of both surgical sites. There is neurapraxia of the right forehead. A/P:  Wound check. The surgical site is healing well despite small foci of vascular compromise. We will continue to monitor the neurapraxia of the right forehead. Additional care was reviewed including liberal application of Vaseline several times daily and gentle scar massage starting at 3 weeks postop. Follow up will be in 2 weeks.

## 2019-08-19 NOTE — PROGRESS NOTES
Patient presents for suture removal. The wound is well healed without signs of infection. The sutures are removed. Wound care and activity instructions given. Return prn.     Right forehead

## 2019-09-03 ENCOUNTER — OFFICE VISIT (OUTPATIENT)
Dept: DERMATOLOGY | Facility: AMBULATORY SURGERY CENTER | Age: 49
End: 2019-09-03

## 2019-09-03 DIAGNOSIS — C44.329 SQUAMOUS CELL CARCINOMA OF SKIN OF LEFT TEMPLE: ICD-10-CM

## 2019-09-03 DIAGNOSIS — C44.319 BASAL CELL CARCINOMA (BCC) OF RIGHT FOREHEAD: Primary | ICD-10-CM

## 2019-09-03 NOTE — PROGRESS NOTES
Wound check/suture removal:    Chief complaint: wound check. 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 Exam: The surgical site was examined. There is not evidence of infection. There is erythema. There is edema. There is sharad flap tip necrosis on the right forehead surgical site. There is modest right upper eyelid edema. Frontalis function on the right is not intact. There is a small area of nearly completely healed flap tip necrosis on the left temple. A/P:  Wound check. The surgical site on the left temple is healing well, however that on the right forehead is doing poorly. Additional care was reviewed including liberal application of Vaseline several times daily. Going forward the patient will continue to apply liberal amounts of Vaseline to both surgical sites and keep them bandaged. We will arrange close follow-up every 2 weeks until the forehead is healed. We will monitor frontalis function on the right at this time to determine whether we are experiencing neurapraxia or paresis. Right upper eyelid edema will resolve over the next several weeks. I anticipate that as the right forehead heals the eyebrow will rise with it. We will consider revision or referral for revision if needed should the frontalis function fail to return.

## 2019-09-03 NOTE — PATIENT INSTRUCTIONS
Chief Complaint   Patient presents with    Surgical Follow-up     Mohs wound check  - Left temple and right forehead     Self Skin Exam and Sunscreens    Early detection and treatment is essential in the treatment of all forms of skin cancer. If caught early, all forms of skin cancer are curable. In addition to your regular visits, you should perform a monthly skin examination. Over time, you become familiar with what is normally found on your skin and can identify new or suspicious spots. One of the screening tools you can use to assess your skin is to follow the ABCDEs:    A= Asymmetry (One half is unlike the other half)     B= Border (An irregular, scalloped or poorly defined edge)    C= Color (Is varied from one area to another, has shades of tan, brown/ black, white, red or blue)    D= Diameter (Spots larger than 6mm or a pencil eraser)    E= Evolving (New spots or one that is changing in size, shape, or color)    A follow- up interval will be customized based on your history of skin cancer or level of skin damage and risk factors. In any case, if you notice a suspicious or new spot, an appointment should be arranged between regular visits. Everyone should use sunscreen and sun-safe practices, which is especially important for those with a personal or family history of skin cancer. Suggestions for this include:    1. Use daily moisturizers containing SPF 30 or higher. 2. Wear long sleeve clothing with UPF ratings and a broad-brimmed hat. 3. Apply sunscreen with SPF 30 or higher to all sun exposed areas if you are going to be in the sun. A broad spectrum UVA/ UVB sunscreen is best.  Dont forget to REAPPLY every two hours or more often if swimming or sweating! 4. Avoid outside activities during peak sun hours, especially in the summer (10am- 2pm). 5. DO NOT use tanning beds.     Using sunscreen and sun-safe practices can help reduce the likelihood of developing skin cancer or additional skin cancers in those previously diagnosed.

## 2019-09-17 ENCOUNTER — OFFICE VISIT (OUTPATIENT)
Dept: DERMATOLOGY | Facility: AMBULATORY SURGERY CENTER | Age: 49
End: 2019-09-17

## 2019-09-17 DIAGNOSIS — C44.319 BASAL CELL CARCINOMA (BCC) OF RIGHT FOREHEAD: Primary | ICD-10-CM

## 2019-09-17 NOTE — PROGRESS NOTES
Wound check/suture removal:     Chief complaint: wound check.     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     Exam: The surgical site was examined. There is not evidence of infection. There is erythema. There is edema. Granulation is proceeding well on the right forehead surgical site. There is modest right upper eyelid edema. Frontalis function on the right is not intact, although there is a small amount of movement near the lateral brow. The left temple is nearly completely healed.     A/P:  Wound check. The surgical site on the left temple is healing well, however that on the right forehead is doing poorly. Additional care was reviewed including liberal application of Vaseline several times daily. Going forward the patient will continue to apply liberal amounts of Vaseline to both surgical sites and keep them bandaged. We will arrange close follow-up every 2 weeks until the forehead is healed. We will monitor frontalis function on the right at this time to determine whether we are experiencing neurapraxia or paresis. Right upper eyelid edema will resolve over the next several weeks. I anticipate that as the right forehead heals the eyebrow will rise with it.   We will consider revision or referral for revision if needed should the frontalis function fail to return.

## 2019-10-02 ENCOUNTER — OFFICE VISIT (OUTPATIENT)
Dept: DERMATOLOGY | Facility: AMBULATORY SURGERY CENTER | Age: 49
End: 2019-10-02

## 2019-10-02 DIAGNOSIS — C44.319 BASAL CELL CARCINOMA (BCC) OF RIGHT FOREHEAD: Primary | ICD-10-CM

## 2019-10-02 DIAGNOSIS — C44.329 SQUAMOUS CELL CARCINOMA OF SKIN OF LEFT TEMPLE: ICD-10-CM

## 2019-10-02 NOTE — PROGRESS NOTES
Wound check/suture removal:     Chief complaint: wound check.     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     Exam: The surgical site was examined. Charolett Tee is not evidence of infection.  There is erythema.  There is edema.    Granulation is proceeding well on the right forehead surgical site.  Frontalis function on the right is not intact, although there is a small amount of movement near the lateral brow.    The left temple is nearly completely healed.     A/P:  Wound check.  The surgical site on the left temple is healing well and that on the right forehead has improved markedly since last visit.   Additional care was reviewed including liberal application of Vaseline several times daily.  Going forward the patient will continue to apply liberal amounts of Vaseline to both surgical sites and keep them bandaged.  We will arrange close follow-up every 4 weeks until the forehead is healed.  We will monitor frontalis function on the right at this time to determine whether we are experiencing neurapraxia or paresis.  Right upper eyelid edema will resolve over the next several weeks.  I anticipate that as the right forehead heals the eyebrow will rise with it.  We will consider revision or referral for revision if needed should the frontalis function fail to return.

## 2019-11-05 ENCOUNTER — OFFICE VISIT (OUTPATIENT)
Dept: DERMATOLOGY | Facility: AMBULATORY SURGERY CENTER | Age: 49
End: 2019-11-05

## 2019-11-05 DIAGNOSIS — C44.319 BASAL CELL CARCINOMA (BCC) OF RIGHT FOREHEAD: Primary | ICD-10-CM

## 2019-11-05 NOTE — PROGRESS NOTES
Wound check/suture removal:    Chief complaint: wound check. HPI: Estrella Carrion presents for wound check following Mohs surgery to treat a biopsy-proven basal cell carcinoma on the forehead that was repaired with a combination of flaps performed several months ago. The flap tips failed and underwent necrosis and have granulated. Exam: The surgical site was examined. There is not evidence of infection. There is not erythema. There is not edema. There is a firm and slightly hypertrophic granulated portion of scar at the center of the supra brow. The medial portion of the scar is inverted. Frontalis function is intact. A/P:  Wound check. The surgical site is healing well but requires some revision. Additional care was reviewed including liberal application of Vaseline several times daily and gentle scar massage starting at 3 weeks postop. Follow up will be in 1 month. 0.4 mL of Kenalog 40 were injected into the hypertrophic portions of the scar today. The patient will return in 1 month at which time she may need additional Kenalog. We will discuss at that time dermabrasion versus electrobrasion. It may be necessary to excise and re-suture the inverted portion of the scar.   It would also like to be beneficial to serially excise the granulated portion of the scar

## 2019-11-20 ENCOUNTER — TELEPHONE (OUTPATIENT)
Dept: INTERNAL MEDICINE CLINIC | Age: 49
End: 2019-11-20

## 2019-11-20 RX ORDER — LOSARTAN POTASSIUM 50 MG/1
50 TABLET ORAL DAILY
Qty: 90 TAB | Refills: 3 | Status: SHIPPED | OUTPATIENT
Start: 2019-11-20 | End: 2020-10-23 | Stop reason: SDUPTHER

## 2019-11-20 RX ORDER — HYDROCHLOROTHIAZIDE 12.5 MG/1
12.5 TABLET ORAL DAILY
Qty: 90 TAB | Refills: 3 | Status: SHIPPED | OUTPATIENT
Start: 2019-11-20 | End: 2020-10-23 | Stop reason: SDUPTHER

## 2019-11-20 NOTE — TELEPHONE ENCOUNTER
[facility] states they needs a call back in reference to Losartan medication change due to unavailability. Please call.  Thank you      Call [phone]   Ref#   [id number]

## 2019-12-03 ENCOUNTER — OFFICE VISIT (OUTPATIENT)
Dept: DERMATOLOGY | Facility: AMBULATORY SURGERY CENTER | Age: 49
End: 2019-12-03

## 2019-12-03 DIAGNOSIS — C44.319 BASAL CELL CARCINOMA (BCC) OF RIGHT FOREHEAD: Primary | ICD-10-CM

## 2019-12-03 NOTE — PROGRESS NOTES
Wound check/suture removal:    Chief complaint: wound check. HPI: Brenda Welch presents for wound check following Mohs surgery to treat a large basal cell carcinoma on the right forehead repaired with a series of advancement flaps performed several months ago. At the distal portions of the flaps failed and have completed granulation. At last visit she received a small amount of intralesional Kenalog for some mildly hypertrophic portions of the scar. She has not had any problems since her last visit. Exam: The surgical site was examined. There is not evidence of infection. There is erythema. There is not edema. Frontalis function on the right is intact. A/P:  Wound check. The surgical site is healing well. Additional care was reviewed including liberal application of Vaseline several times daily and gentle scar massage starting at 3 weeks postop. Follow up will be as needed if the patient would like for scar revision using serial excision.

## 2019-12-06 DIAGNOSIS — F51.01 PRIMARY INSOMNIA: ICD-10-CM

## 2019-12-06 RX ORDER — ZOLPIDEM TARTRATE 5 MG/1
TABLET ORAL
Qty: 30 TAB | Refills: 5 | Status: SHIPPED | OUTPATIENT
Start: 2019-12-06 | End: 2020-06-11 | Stop reason: SDUPTHER

## 2020-02-06 RX ORDER — LEVOTHYROXINE SODIUM 112 UG/1
TABLET ORAL
Qty: 90 TAB | Refills: 4 | Status: SHIPPED | OUTPATIENT
Start: 2020-02-06 | End: 2020-08-06 | Stop reason: SDUPTHER

## 2020-02-06 RX ORDER — PAROXETINE HYDROCHLORIDE 20 MG/1
TABLET, FILM COATED ORAL
Qty: 90 TAB | Refills: 4 | Status: SHIPPED | OUTPATIENT
Start: 2020-02-06 | End: 2020-12-16 | Stop reason: SDUPTHER

## 2020-04-21 ENCOUNTER — TELEPHONE (OUTPATIENT)
Dept: INTERNAL MEDICINE CLINIC | Age: 50
End: 2020-04-21

## 2020-04-21 NOTE — TELEPHONE ENCOUNTER
[facility]   Phone Number: [phone]             Appointment Request From: Sanjana Loredo     With Provider: David Soria MD [-Primary Care Physician-]     Preferred Date Range: From 4/19/2020 To 6/16/2020     Preferred Times: Tuesday Morning     Reason: To address the following health maintenance concerns. Pneumococcal 0-64 Years     Comments:   Yearly check up.      Copy/Paste  ENVERA

## 2020-06-04 DIAGNOSIS — F51.01 PRIMARY INSOMNIA: ICD-10-CM

## 2020-06-04 RX ORDER — ZOLPIDEM TARTRATE 5 MG/1
TABLET ORAL
Qty: 30 TAB | Refills: 5 | OUTPATIENT
Start: 2020-06-04

## 2020-06-04 NOTE — TELEPHONE ENCOUNTER
PCP: Zackery Evangelista MD    Last appt: 2/25/2019  No future appointments.     Requested Prescriptions     Pending Prescriptions Disp Refills    zolpidem (AMBIEN) 5 mg tablet 30 Tab 5

## 2020-06-11 ENCOUNTER — VIRTUAL VISIT (OUTPATIENT)
Dept: INTERNAL MEDICINE CLINIC | Age: 50
End: 2020-06-11

## 2020-06-11 DIAGNOSIS — Z12.39 BREAST SCREENING: Primary | ICD-10-CM

## 2020-06-11 DIAGNOSIS — I10 ESSENTIAL HYPERTENSION: ICD-10-CM

## 2020-06-11 DIAGNOSIS — F17.200 SMOKER: ICD-10-CM

## 2020-06-11 DIAGNOSIS — M25.50 ARTHRALGIA, UNSPECIFIED JOINT: ICD-10-CM

## 2020-06-11 DIAGNOSIS — F51.01 PRIMARY INSOMNIA: ICD-10-CM

## 2020-06-11 DIAGNOSIS — E03.9 ACQUIRED HYPOTHYROIDISM: ICD-10-CM

## 2020-06-11 DIAGNOSIS — F32.9 MAJOR DEPRESSIVE DISORDER, REMISSION STATUS UNSPECIFIED, UNSPECIFIED WHETHER RECURRENT: ICD-10-CM

## 2020-06-11 DIAGNOSIS — Z00.00 ROUTINE GENERAL MEDICAL EXAMINATION AT A HEALTH CARE FACILITY: ICD-10-CM

## 2020-06-11 RX ORDER — ZOLPIDEM TARTRATE 5 MG/1
TABLET ORAL
Qty: 30 TAB | Refills: 5 | Status: SHIPPED | OUTPATIENT
Start: 2020-06-11 | End: 2020-12-14

## 2020-06-11 NOTE — PROGRESS NOTES
HISTORY OF Cj Gomes is a 44 y.o. female. HPI   Onelia Sweet is a 44 y.o. female being evaluated by a Virtual Visit (video visit) encounter to address concerns as mentioned above. A caregiver was present when appropriate. Due to this being a TeleHealth encounter (During XPVLB-70 public health emergency), evaluation of the following organ systems was limited: Vitals/Constitutional/EENT/Resp/CV/GI//MS/Neuro/Skin/Heme-Lymph-Imm. Pursuant to the emergency declaration under the [facility], [facility] authority and the [employer] Act, this Virtual Visit was conducted with patient's (and/or legal guardian's) consent, to reduce the risk of exposure to COVID-19 and provide necessary medical care. Services were provided through a video synchronous discussion virtually to substitute for in-person encounter. --Elana Ness MD on 6/11/2020 at 12:38 PM    An electronic signature was used to authenticate this note.    Here for CPE and   F/u depression, hypothroidism, anemia--s/p total- hyst and b/l salpingectomy--Davinci--Dr Liu, HTN,  Had melanoma of forehead and BCC--had surgery-Dr Amanda Day for Mohs  Feels tired  Joint pain all over and swelling of joitns  Home BP-none  Weight--165lb  Smoker <1 ppd  ovedue mammogram  On paxil for MDD   Quit etoh    Last OV     Dx with HTN since last OV and started on losartan/hct  tsh 11--levothyroxine increased to  125 mcg every day in August 2018 but taking 112 mcg dose now!     Had renal duplex--severe SMA and celiac artery stenosis--referred to vascular MD but no appt------  Denies any abdominal pain  Saw  Dr Juan Jose Penny for MDD anxiety insomnia and was restarted on paxil which helps a lot per pt     Due for mammogram and colonscopy------  Had colonoscopy in [address] [facility] 5 yrs ago     C/o fatigue and pain in all joints especially pain in feet     Did not see Conception Ly MD for lesions on the forehead        Smoker 1ppd    Patient Active Problem List    Diagnosis Date Noted    Iron deficiency anemia due to chronic blood loss 02/16/2017    Hypothyroidism 02/11/2013    Smoker 02/11/2013    Depression 02/11/2013    Anxiety 02/11/2013    AK (actinic keratosis) 02/11/2013     Current Outpatient Medications   Medication Sig Dispense Refill    zolpidem (AMBIEN) 5 mg tablet TAKE 1 TABLET AT BEDTIME AS NEEDED FOR SLEEP 30 Tab 5    PARoxetine (PAXIL) 20 mg tablet TAKE 1 TABLET DAILY 90 Tab 4    SYNTHROID 112 mcg tablet TAKE 1 TABLET DAILY BEFORE BREAKFAST 90 Tab 4    losartan (COZAAR) 50 mg tablet Take 1 Tab by mouth daily. 90 Tab 3    hydroCHLOROthiazide (HYDRODIURIL) 12.5 mg tablet Take 1 Tab by mouth daily. 90 Tab 3    cyanocobalamin, vitamin B-12, (VITAMIN B12 PO) Take  by mouth.  glucosam-chond-hyalu-CF borate (MOVE FREE [employer]") 750 mg-100 mg- 1.65 mg-108 mg tab Take  by mouth.  zolpidem (AMBIEN) 5 mg tablet Take 1 Tab by mouth nightly as needed for Sleep. Max Daily Amount: 5 mg. Indications: SLEEP-ONSET INSOMNIA 30 Tab 2    ibuprofen (ADVIL) 200 mg tablet Take 400 mg by mouth every six (6) hours as needed for Pain.        No Known Allergies  Social History     Tobacco Use    Smoking status: Current Every Day Smoker     Packs/day: 1.00     Years: 30.00     Pack years: 30.00    Smokeless tobacco: Never Used   Substance Use Topics    Alcohol use: Yes     Frequency: 4 or more times a week     Drinks per session: 1 or 2     Comment: shot of liquor -drink daily      Lab Results   Component Value Date/Time    WBC 7.7 08/06/2018 09:33 AM    HGB 15.0 08/06/2018 09:33 AM    Hemoglobin (POC) 9.1 (A) 05/18/2017 11:02 AM    HCT 46.8 (H) 08/06/2018 09:33 AM    PLATELET 238 07/56/3123 09:33 AM     (H) 08/06/2018 09:33 AM     Lab Results   Component Value Date/Time    Cholesterol, total 174 02/25/2019 02:31 PM    HDL Cholesterol 49 02/25/2019 02:31 PM    LDL, calculated 99 02/25/2019 02:31 PM    Triglyceride 131 02/25/2019 02:31 PM     Lab Results   Component Value Date/Time    TSH 0.614 02/25/2019 02:31 PM    T4, Free 1.48 02/25/2019 02:31 PM         Review of Systems   Constitutional: Negative for chills, fever, malaise/fatigue and weight loss. Eyes: Negative for blurred vision and double vision. Respiratory: Negative for cough and shortness of breath. Cardiovascular: Negative for chest pain and palpitations. Gastrointestinal: Negative for abdominal pain, blood in stool, constipation, diarrhea, melena, nausea and vomiting. Genitourinary: Negative for dysuria, frequency, hematuria and urgency. Musculoskeletal: Negative for back pain, falls, joint pain and myalgias. Neurological: Negative for dizziness, tremors and headaches. Physical Exam  Constitutional:       Appearance: Normal appearance. Pulmonary:      Effort: Pulmonary effort is normal.   Neurological:      Mental Status: She is alert. Psychiatric:         Mood and Affect: Mood normal.         Behavior: Behavior normal.         Thought Content: Thought content normal.         Judgment: Judgment normal.         ASSESSMENT and PLAN  Diagnoses and all orders for this visit:    1. Breast screening  -     [facility] MAMMO BI SCREENING INCL CAD; Future    2. Routine general medical examination at a health care facility  -     CBC W/O DIFF  -     METABOLIC PANEL, COMPREHENSIVE  -     LIPID PANEL  -     TSH 3RD GENERATION  -     T4, FREE  -     URINALYSIS W/ RFLX MICROSCOPIC  -     [facility] MAMMO BI SCREENING INCL CAD; Future   Pneumovax and shingrix  recommended  3. Essential hypertension   Advised bp monitoring  4. Acquired hypothyroidism  -     TSH 3RD GENERATION  -     T4, FREE    5. Major depressive disorder, remission status unspecified, unspecified whether recurrent   On SSRI-continue   6. Smoker   Advised cessation  7.  Primary insomnia  -     zolpidem (AMBIEN) 5 mg tablet; TAKE 1 TABLET AT BEDTIME AS NEEDED FOR SLEEP    8.  Arthralgia, unspecified joint  -     RHEUMATOID FACTOR, QL        RTC 6 months

## 2020-08-06 DIAGNOSIS — E03.9 HYPOTHYROIDISM, UNSPECIFIED TYPE: Primary | ICD-10-CM

## 2020-08-06 RX ORDER — LEVOTHYROXINE SODIUM 112 UG/1
112 TABLET ORAL
Qty: 90 TAB | Refills: 3 | Status: SHIPPED | OUTPATIENT
Start: 2020-08-06 | End: 2021-10-10

## 2020-08-06 NOTE — TELEPHONE ENCOUNTER
CP: Danielle Quach MD    Last appt: Visit date not found  Future Appointments   Date Time Provider Venkata Rene   12/16/2020 10:00 AM Danielle Quach MD [facility] BS AMB       Requested Prescriptions     Pending Prescriptions Disp Refills    Synthroid 112 mcg tablet 90 Tab 3     Sig: Take 1 Tab by mouth Daily (before breakfast).

## 2020-10-20 ENCOUNTER — TELEPHONE (OUTPATIENT)
Dept: INTERNAL MEDICINE CLINIC | Age: 50
End: 2020-10-20

## 2020-10-20 NOTE — TELEPHONE ENCOUNTER
Wilmer Horta, [address] [address] Office Pool               General Message/Vendor Calls     Caller's first and last name: Mariama Gayle with [facility]       Reason for call: Requesting a call back regarding Synthroid 112 micrograms. Callback required yes/no and why: yes       Best contact number(s): [phone]       Details to clarify the request: Please use reference #[id number] when calling back.        [employer]

## 2020-10-23 DIAGNOSIS — I10 ESSENTIAL HYPERTENSION: Primary | ICD-10-CM

## 2020-10-23 RX ORDER — LOSARTAN POTASSIUM 50 MG/1
50 TABLET ORAL DAILY
Qty: 90 TAB | Refills: 3 | Status: SHIPPED | OUTPATIENT
Start: 2020-10-23 | End: 2021-10-18

## 2020-10-23 RX ORDER — HYDROCHLOROTHIAZIDE 12.5 MG/1
12.5 TABLET ORAL DAILY
Qty: 90 TAB | Refills: 3 | Status: SHIPPED | OUTPATIENT
Start: 2020-10-23 | End: 2021-10-18

## 2020-10-23 NOTE — TELEPHONE ENCOUNTER
CP: Saw Cortés MD    Last appt: 6/11/2020  Future Appointments   Date Time Provider Venkata Rene   12/16/2020 10:00 AM Saw Cortés MD [facility] BS AMB       Requested Prescriptions     Pending Prescriptions Disp Refills    losartan (COZAAR) 50 mg tablet 90 Tab 3     Sig: Take 1 Tab by mouth daily.  hydroCHLOROthiazide (HYDRODIURIL) 12.5 mg tablet 90 Tab 3     Sig: Take 1 Tab by mouth daily.

## 2020-12-14 DIAGNOSIS — F51.01 PRIMARY INSOMNIA: ICD-10-CM

## 2020-12-14 RX ORDER — ZOLPIDEM TARTRATE 5 MG/1
TABLET ORAL
Qty: 30 TAB | Refills: 5 | Status: SHIPPED | OUTPATIENT
Start: 2020-12-14 | End: 2020-12-16 | Stop reason: ALTCHOICE

## 2020-12-15 PROBLEM — I10 ESSENTIAL HYPERTENSION: Status: ACTIVE | Noted: 2020-12-15

## 2020-12-16 ENCOUNTER — VIRTUAL VISIT (OUTPATIENT)
Dept: INTERNAL MEDICINE CLINIC | Age: 50
End: 2020-12-16

## 2020-12-16 DIAGNOSIS — Z12.39 BREAST SCREENING: ICD-10-CM

## 2020-12-16 DIAGNOSIS — G89.29 CHRONIC KNEE PAIN, UNSPECIFIED LATERALITY: ICD-10-CM

## 2020-12-16 DIAGNOSIS — F32.9 MAJOR DEPRESSIVE DISORDER, REMISSION STATUS UNSPECIFIED, UNSPECIFIED WHETHER RECURRENT: Primary | ICD-10-CM

## 2020-12-16 DIAGNOSIS — F41.9 ANXIETY: ICD-10-CM

## 2020-12-16 DIAGNOSIS — I10 ESSENTIAL HYPERTENSION: ICD-10-CM

## 2020-12-16 DIAGNOSIS — E03.9 HYPOTHYROIDISM, UNSPECIFIED TYPE: ICD-10-CM

## 2020-12-16 DIAGNOSIS — Z00.00 ROUTINE GENERAL MEDICAL EXAMINATION AT A HEALTH CARE FACILITY: ICD-10-CM

## 2020-12-16 DIAGNOSIS — F17.200 SMOKER: ICD-10-CM

## 2020-12-16 DIAGNOSIS — M25.569 CHRONIC KNEE PAIN, UNSPECIFIED LATERALITY: ICD-10-CM

## 2020-12-16 DIAGNOSIS — F51.01 PRIMARY INSOMNIA: ICD-10-CM

## 2020-12-16 PROCEDURE — 99396 PREV VISIT EST AGE 40-64: CPT | Performed by: INTERNAL MEDICINE

## 2020-12-16 RX ORDER — OMEPRAZOLE 20 MG/1
20 CAPSULE, DELAYED RELEASE ORAL DAILY
COMMUNITY
End: 2022-04-01 | Stop reason: ALTCHOICE

## 2020-12-16 RX ORDER — DULOXETIN HYDROCHLORIDE 60 MG/1
60 CAPSULE, DELAYED RELEASE ORAL DAILY
Qty: 90 CAP | Refills: 1 | Status: SHIPPED | OUTPATIENT
Start: 2020-12-16 | End: 2020-12-22 | Stop reason: SDUPTHER

## 2020-12-16 RX ORDER — PAROXETINE HYDROCHLORIDE 20 MG/1
TABLET, FILM COATED ORAL
Qty: 90 TAB | Refills: 4 | Status: SHIPPED | OUTPATIENT
Start: 2020-12-16 | End: 2020-12-16 | Stop reason: CLARIF

## 2020-12-16 NOTE — PATIENT INSTRUCTIONS
This is an established visit conducted via telemedicine. The patient has been instructed that this meets HIPAA criteria and acknowledges and agrees to this method of visitation. La Nena Green LPN 
71/[birth year]/78 
6:27 AM 
 
1. Have you been to the ER, urgent care clinic since your last visit? Hospitalized since your last visit? NO 
 
2. Have you seen or consulted any other health care providers outside of the [address] [place] [address] since your last visit? Include any pap smears or colon screening.  No

## 2020-12-16 NOTE — PROGRESS NOTES
HISTORY OF Cj Gomes is a 44 y.o. female. HPI     Colleen Matthews is a 44 y.o. female being evaluated by a Virtual Visit (video visit) encounter to address concerns as mentioned above. A caregiver was present when appropriate. Due to this being a TeleHealth encounter (During OZUBA-19 public health emergency), evaluation of the following organ systems was limited: Vitals/Constitutional/EENT/Resp/CV/GI//MS/Neuro/Skin/Heme-Lymph-Imm. Pursuant to the emergency declaration under the [facility], [facility] authority and the [employer] Act, this Virtual Visit was conducted with patient's (and/or legal guardian's) consent, to reduce the risk of exposure to COVID-19 and provide necessary medical care. Services were provided through a video synchronous discussion virtually to substitute for in-person encounter.     --Kailey Lilly MD on 12/15/2020 at 9:56 PM    An electronic signature was used to authenticate this note.     F/u depression, hypothroidism, anemia--s/p total- hyst and b/l salpingectomy--Davinci--Dr Liu, HTN,  Depression symptoms-difficulty eating and sleeping     Hx PTSD  Having more anxiety now on paxil-cant relax  Feels more depressed  No SI/MI  Smokes tobacco-1ppd   Stopped etoh completely--former  Sees DERM MD ofr hx BCC of forehead  She enjoys her job--[employer] for [employer] last 2 years  Has chronic diarrhea--had laondvucum-mmywoimkq-fjhlqx polyp 2019    C/opain in knees ,hands and feetwith righ tknee effusion  C/o feeling tired but not cold  Last OV  Had melanoma of forehead and BCC--had surgery-Dr Yesenia Lanier for Mohs  Feels tired  Joint pain all over and swelling of joitns  Home BP-none  Weight--165lb  Smoker <1 ppd  ovedue mammogram  On paxil for MDD   Quit etoh       Patient Active Problem List    Diagnosis Date Noted    Iron deficiency anemia due to chronic blood loss 02/16/2017    Hypothyroidism 02/11/2013    Smoker 02/11/2013    Depression 02/11/2013    Anxiety 02/11/2013    AK (actinic keratosis) 02/11/2013     Current Outpatient Medications   Medication Sig Dispense Refill    zolpidem (AMBIEN) 5 mg tablet TAKE 1 TABLET BY MOUTH EVERY DAY AT BEDTIME AS NEEDED FOR SLEEP 30 Tab 5    losartan (COZAAR) 50 mg tablet Take 1 Tab by mouth daily. 90 Tab 3    hydroCHLOROthiazide (HYDRODIURIL) 12.5 mg tablet Take 1 Tab by mouth daily. 90 Tab 3    Synthroid 112 mcg tablet Take 1 Tab by mouth Daily (before breakfast). 90 Tab 3    PARoxetine (PAXIL) 20 mg tablet TAKE 1 TABLET DAILY 90 Tab 4    cyanocobalamin, vitamin B-12, (VITAMIN B12 PO) Take  by mouth.  glucosam-chond-hyalu-CF borate (MOVE FREE [employer]) 750 mg-100 mg- 1.65 mg-108 mg tab Take  by mouth.  zolpidem (AMBIEN) 5 mg tablet Take 1 Tab by mouth nightly as needed for Sleep. Max Daily Amount: 5 mg. Indications: SLEEP-ONSET INSOMNIA 30 Tab 2    ibuprofen (ADVIL) 200 mg tablet Take 400 mg by mouth every six (6) hours as needed for Pain.        No Known Allergies   Lab Results   Component Value Date/Time    WBC 7.7 08/06/2018 09:33 AM    HGB 15.0 08/06/2018 09:33 AM    Hemoglobin (POC) 9.1 (A) 05/18/2017 11:02 AM    HCT 46.8 (H) 08/06/2018 09:33 AM    PLATELET 882 25/48/8878 09:33 AM     (H) 08/06/2018 09:33 AM     Lab Results   Component Value Date/Time    Glucose 89 02/25/2019 02:31 PM    LDL, calculated 99 02/25/2019 02:31 PM    Creatinine 0.48 (L) 02/25/2019 02:31 PM      Lab Results   Component Value Date/Time    Cholesterol, total 174 02/25/2019 02:31 PM    HDL Cholesterol 49 02/25/2019 02:31 PM    LDL, calculated 99 02/25/2019 02:31 PM    Triglyceride 131 02/25/2019 02:31 PM     Lab Results   Component Value Date/Time    GFR est non- 02/25/2019 02:31 PM    GFR est  02/25/2019 02:31 PM    Creatinine 0.48 (L) 02/25/2019 02:31 PM    BUN 9 02/25/2019 02:31 PM    Sodium 140 02/25/2019 02:31 PM Potassium 4.3 02/25/2019 02:31 PM    Chloride 102 02/25/2019 02:31 PM    CO2 24 02/25/2019 02:31 PM     Lab Results   Component Value Date/Time    TSH 0.614 02/25/2019 02:31 PM    T4, Free 1.48 02/25/2019 02:31 PM         ROS    Physical Exam  Constitutional:       Appearance: She is obese. HENT:      Head: Normocephalic. Pulmonary:      Effort: Pulmonary effort is normal.   Neurological:      General: No focal deficit present. Mental Status: She is alert. Mental status is at baseline. Psychiatric:         Mood and Affect: Mood normal.         Behavior: Behavior normal.         Thought Content: Thought content normal.         Judgment: Judgment normal.         ASSESSMENT and PLAN  Diagnoses and all orders for this visit:    1. Major depressive disorder, remission status unspecified, unspecified whether recurrent  -     REFERRAL TO PSYCHOLOGY   Taper off paxil   Start cymbalta 60 mg every day for depression/anxiety and pain  2. Hypothyroidism, unspecified type  -     TSH 3RD GENERATION   On LT4  3. Essential hypertension  -     CBC W/O DIFF  -     METABOLIC PANEL, COMPREHENSIVE   controlled  4. Smoker   Advised cessatopm  5. Primary insomnia   On ambien hs prn  6. Breast screening  -     [facility] MAMMO BI SCREENING INCL CAD; Future    7. Anxiety   cymbalta rx  8. Chronic knee pain, unspecified laterality  -     REFERRAL TO ORTHOPEDICS     9. CPE   Mammogram   Labs   UTD colonsocopy   Advised tobacco cesstaion   decined all immunizations  Other orders  -     DULoxetine (CYMBALTA) 60 mg capsule; Take 1 Cap by mouth daily.     rtc 2 months VV MDD/anxiety

## 2020-12-16 NOTE — TELEPHONE ENCOUNTER
Future Appointments:  Future Appointments   Date Time Provider Venkata Anju   12/16/2020 10:00 AM Kelsey Ortiz MD [facility] BS AMB        Last Appointment With Me:  Visit date not found     Requested Prescriptions     Pending Prescriptions Disp Refills    PARoxetine (PAXIL) 20 mg tablet 90 Tab 4     Sig: TAKE 1 TABLET DAILY

## 2020-12-22 ENCOUNTER — TELEPHONE (OUTPATIENT)
Dept: INTERNAL MEDICINE CLINIC | Age: 50
End: 2020-12-22

## 2020-12-22 NOTE — TELEPHONE ENCOUNTER
Patient states she needs a call to be made to [facility] on her medication prescribed on 12/16/20 for her DULoxetine (CYMBALTA) 60 mg capsule. Please call Pharmacy or if patient if any questions.  Thank you

## 2020-12-22 NOTE — TELEPHONE ENCOUNTER
Future Appointments:  Future Appointments   Date Time Provider Venkata Rene   2/16/2021  2:45 PM Bea Blake MD [facility] BS AMB        Last Appointment With Me:  12/16/2020     Requested Prescriptions      No prescriptions requested or ordered in this encounter

## 2020-12-23 RX ORDER — DULOXETIN HYDROCHLORIDE 60 MG/1
60 CAPSULE, DELAYED RELEASE ORAL DAILY
Qty: 90 CAP | Refills: 1 | Status: SHIPPED | OUTPATIENT
Start: 2020-12-23 | End: 2021-06-17

## 2021-04-06 ENCOUNTER — TELEPHONE (OUTPATIENT)
Dept: INTERNAL MEDICINE CLINIC | Age: 51
End: 2021-04-06

## 2021-04-06 NOTE — TELEPHONE ENCOUNTER
Pt states she needs an ointment refilled that she used to get from her cancer doctor. She is no longer practicing. This is for sores at the corner of her mouth. Mupirocin ointment 2 %     [facility] as [facility].      Any questions please call pt

## 2021-04-08 RX ORDER — MUPIROCIN 20 MG/G
OINTMENT TOPICAL DAILY
Qty: 22 G | Refills: 0 | Status: SHIPPED | OUTPATIENT
Start: 2021-04-08 | End: 2022-02-23 | Stop reason: SDUPTHER

## 2021-04-08 NOTE — TELEPHONE ENCOUNTER
Called out and spoke to pt. Two pt identifiers confirmed. Informed pt that her rx was sent in for her. Pt verbalized understanding of information discussed w/ no further questions at this time.

## 2021-06-17 RX ORDER — DULOXETIN HYDROCHLORIDE 60 MG/1
CAPSULE, DELAYED RELEASE ORAL
Qty: 90 CAPSULE | Refills: 3 | Status: SHIPPED | OUTPATIENT
Start: 2021-06-17 | End: 2022-01-14 | Stop reason: SDUPTHER

## 2021-10-10 DIAGNOSIS — E03.9 HYPOTHYROIDISM, UNSPECIFIED TYPE: ICD-10-CM

## 2021-10-10 RX ORDER — LEVOTHYROXINE SODIUM 112 UG/1
TABLET ORAL
Qty: 90 TABLET | Refills: 3 | Status: SHIPPED | OUTPATIENT
Start: 2021-10-10 | End: 2022-02-18 | Stop reason: SDUPTHER

## 2021-10-15 ENCOUNTER — TELEPHONE (OUTPATIENT)
Dept: INTERNAL MEDICINE CLINIC | Age: 51
End: 2021-10-15

## 2021-10-15 NOTE — TELEPHONE ENCOUNTER
States that dr Yosef Quigley says no generic, however the brand name is the generic for [facility] and will save pt a lot. Asking if dr can remove restriction so can still get brand name out but at generic pricing     For synthroid. Please call    BIJU valentine  [phone]   Reference # [id number]      States very time sensitive.

## 2021-10-15 NOTE — TELEPHONE ENCOUNTER
Pharmacy states that they would like to remove the ZAID for synthroid script. Pharmacy states that the brand name of synthroid is considered generic with pharmacy and they would like to remove ZAID so they could save patient money in filing script. Provided pharmacy verbal order to remove ZAID at this time. No further actions required at this time.

## 2022-01-17 ENCOUNTER — DOCUMENTATION ONLY (OUTPATIENT)
Dept: INTERNAL MEDICINE CLINIC | Age: 52
End: 2022-01-17

## 2022-02-18 DIAGNOSIS — E03.9 HYPOTHYROIDISM, UNSPECIFIED TYPE: ICD-10-CM

## 2022-02-18 RX ORDER — LEVOTHYROXINE SODIUM 112 UG/1
112 TABLET ORAL
Qty: 90 TABLET | Refills: 3 | Status: SHIPPED | OUTPATIENT
Start: 2022-02-18 | End: 2022-02-21 | Stop reason: SDUPTHER

## 2022-02-18 NOTE — TELEPHONE ENCOUNTER
----- Message from [facility] sent at 2/18/2022  9:08 AM EST -----  Subject: Refill Request    QUESTIONS  Name of Medication? Synthroid 112 mcg tablet  Patient-reported dosage and instructions? 1 a day  How many days do you have left? 0  [id number]  Pharmacy phone number (if available)? [phone]  Additional Information for Provider? ###MUST BE CHANGED TO GENERIC   Levothyroxine Per Insurance###  ---------------------------------------------------------------------------  --------------,  Name of Medication? mupirocin (BACTROBAN) 2 % ointment  Patient-reported dosage and instructions? as needed  How many days do you have left? 0  Preferred Pharmacy? [address] [address] #[id number]  Pharmacy phone number (if available)? [phone]  Additional Information for Provider? Pt will see if a 30 day or 90 is   cheapest with new insurance#  ---------------------------------------------------------------------------  --------------  CALL BACK INFO  What is the best way for the office to contact you? OK to leave message on   voicemail  Preferred Call Back Phone Number?  [phone]

## 2022-02-19 ENCOUNTER — PATIENT MESSAGE (OUTPATIENT)
Dept: INTERNAL MEDICINE CLINIC | Age: 52
End: 2022-02-19

## 2022-02-19 DIAGNOSIS — E03.9 HYPOTHYROIDISM, UNSPECIFIED TYPE: ICD-10-CM

## 2022-02-21 RX ORDER — LEVOTHYROXINE SODIUM 112 UG/1
112 TABLET ORAL
Qty: 90 TABLET | Refills: 3 | Status: SHIPPED | OUTPATIENT
Start: 2022-02-21 | End: 2022-04-07 | Stop reason: DRUGHIGH

## 2022-02-21 NOTE — TELEPHONE ENCOUNTER
From: [place]  To: Jc Cortes MD  Sent: 2/19/2022 3:24 PM EST  Subject: Script Sent To Wrong Pharmacy    I called your office a couple days ago to change my medical Information and pharmacy because of my new insurance and to renew my Synthroid prescription. My script was sent to the wrong pharmacy. [facility]. [facility] so [facility] can [facility] up [facility]. I must have Synthroid and not generic.  My new pharmacy is:  [facility] #[id number], [address] [place] [address]  [phone]

## 2022-02-23 RX ORDER — MUPIROCIN 20 MG/G
OINTMENT TOPICAL DAILY
Qty: 22 G | Refills: 0 | Status: SHIPPED | OUTPATIENT
Start: 2022-02-23 | End: 2022-04-01 | Stop reason: ALTCHOICE

## 2022-03-18 PROBLEM — I10 ESSENTIAL HYPERTENSION: Status: ACTIVE | Noted: 2020-12-15

## 2022-03-18 PROBLEM — D50.0 IRON DEFICIENCY ANEMIA DUE TO CHRONIC BLOOD LOSS: Status: ACTIVE | Noted: 2017-02-16

## 2022-03-29 DIAGNOSIS — I10 ESSENTIAL HYPERTENSION: ICD-10-CM

## 2022-03-29 RX ORDER — HYDROCHLOROTHIAZIDE 12.5 MG/1
TABLET ORAL
Qty: 90 TABLET | Refills: 1 | Status: SHIPPED | OUTPATIENT
Start: 2022-03-29 | End: 2022-09-21 | Stop reason: SDUPTHER

## 2022-03-29 RX ORDER — LOSARTAN POTASSIUM 50 MG/1
TABLET ORAL
Qty: 90 TABLET | Refills: 1 | Status: SHIPPED | OUTPATIENT
Start: 2022-03-29 | End: 2022-09-21 | Stop reason: SDUPTHER

## 2022-03-29 NOTE — TELEPHONE ENCOUNTER
PCP: Tracy Castillo MD    Last appt: 12/16/2020  Future Appointments   Date Time Provider Venkata Rene   4/1/2022  9:45 AM Tracy Castillo MD [facility] BS AMB       Requested Prescriptions     Pending Prescriptions Disp Refills    losartan (COZAAR) 50 mg tablet 90 Tablet 1     Sig: TAKE 1 TABLET DAILY    hydroCHLOROthiazide (HYDRODIURIL) 12.5 mg tablet 90 Tablet 1     Sig: TAKE 1 TABLET DAILY       Prior labs and Blood pressures:  BP Readings from Last 3 Encounters:   08/12/19 128/68   08/01/19 124/76   07/23/19 132/86     Lab Results   Component Value Date/Time    Sodium 140 02/25/2019 02:31 PM    Potassium 4.3 02/25/2019 02:31 PM    Chloride 102 02/25/2019 02:31 PM    CO2 24 02/25/2019 02:31 PM    Anion gap 5 05/26/2017 08:23 AM    Glucose 89 02/25/2019 02:31 PM    BUN 9 02/25/2019 02:31 PM    Creatinine 0.48 (L) 02/25/2019 02:31 PM    BUN/Creatinine ratio 19 02/25/2019 02:31 PM    GFR est  02/25/2019 02:31 PM    GFR est non- 02/25/2019 02:31 PM    Calcium 9.2 02/25/2019 02:31 PM     No results found for: HBA1C, JRW5VRBK, AYE8OTOF, EDY0MAQX  Lab Results   Component Value Date/Time    Cholesterol, total 174 02/25/2019 02:31 PM    HDL Cholesterol 49 02/25/2019 02:31 PM    LDL, calculated 99 02/25/2019 02:31 PM    VLDL, calculated 26 02/25/2019 02:31 PM    Triglyceride 131 02/25/2019 02:31 PM     No results found for: BALBIR Ansari    Lab Results   Component Value Date/Time    TSH 0.614 02/25/2019 02:31 PM

## 2022-04-01 ENCOUNTER — OFFICE VISIT (OUTPATIENT)
Dept: INTERNAL MEDICINE CLINIC | Age: 52
End: 2022-04-01
Payer: COMMERCIAL

## 2022-04-01 VITALS
BODY MASS INDEX: 28.6 KG/M2 | SYSTOLIC BLOOD PRESSURE: 110 MMHG | DIASTOLIC BLOOD PRESSURE: 70 MMHG | HEIGHT: 62 IN | WEIGHT: 155.4 LBS | OXYGEN SATURATION: 99 % | RESPIRATION RATE: 16 BRPM | TEMPERATURE: 97.2 F | HEART RATE: 88 BPM

## 2022-04-01 DIAGNOSIS — Z87.891 PERSONAL HISTORY OF TOBACCO USE, PRESENTING HAZARDS TO HEALTH: ICD-10-CM

## 2022-04-01 DIAGNOSIS — Z00.00 ROUTINE GENERAL MEDICAL EXAMINATION AT A HEALTH CARE FACILITY: Primary | ICD-10-CM

## 2022-04-01 DIAGNOSIS — I10 HYPERTENSION, UNSPECIFIED TYPE: ICD-10-CM

## 2022-04-01 DIAGNOSIS — R00.2 PALPITATION: ICD-10-CM

## 2022-04-01 DIAGNOSIS — Z23 ENCOUNTER FOR IMMUNIZATION: ICD-10-CM

## 2022-04-01 DIAGNOSIS — E03.9 ACQUIRED HYPOTHYROIDISM: ICD-10-CM

## 2022-04-01 LAB
ALBUMIN SERPL-MCNC: 3.9 G/DL (ref 3.5–5)
ALBUMIN/GLOB SERPL: 1.1 {RATIO} (ref 1.1–2.2)
ALP SERPL-CCNC: 111 U/L (ref 45–117)
ALT SERPL-CCNC: 33 U/L (ref 12–78)
ANION GAP SERPL CALC-SCNC: 6 MMOL/L (ref 5–15)
AST SERPL-CCNC: 19 U/L (ref 15–37)
BILIRUB SERPL-MCNC: 0.5 MG/DL (ref 0.2–1)
BUN SERPL-MCNC: 13 MG/DL (ref 6–20)
BUN/CREAT SERPL: 22 (ref 12–20)
CALCIUM SERPL-MCNC: 9.7 MG/DL (ref 8.5–10.1)
CHLORIDE SERPL-SCNC: 104 MMOL/L (ref 97–108)
CHOLEST SERPL-MCNC: 187 MG/DL
CO2 SERPL-SCNC: 29 MMOL/L (ref 21–32)
CREAT SERPL-MCNC: 0.59 MG/DL (ref 0.55–1.02)
ERYTHROCYTE [DISTWIDTH] IN BLOOD BY AUTOMATED COUNT: 13.1 % (ref 11.5–14.5)
GLOBULIN SER CALC-MCNC: 3.4 G/DL (ref 2–4)
GLUCOSE SERPL-MCNC: 99 MG/DL (ref 65–100)
HCT VFR BLD AUTO: 44.9 % (ref 35–47)
HCV AB SERPL QL IA: NONREACTIVE
HDLC SERPL-MCNC: 42 MG/DL
HDLC SERPL: 4.5 {RATIO} (ref 0–5)
HGB BLD-MCNC: 14.4 G/DL (ref 11.5–16)
LDLC SERPL CALC-MCNC: 110.4 MG/DL (ref 0–100)
MCH RBC QN AUTO: 31.8 PG (ref 26–34)
MCHC RBC AUTO-ENTMCNC: 32.1 G/DL (ref 30–36.5)
MCV RBC AUTO: 99.1 FL (ref 80–99)
NRBC # BLD: 0 K/UL (ref 0–0.01)
NRBC BLD-RTO: 0 PER 100 WBC
PLATELET # BLD AUTO: 203 K/UL (ref 150–400)
PMV BLD AUTO: 12.1 FL (ref 8.9–12.9)
POTASSIUM SERPL-SCNC: 4.8 MMOL/L (ref 3.5–5.1)
PROT SERPL-MCNC: 7.3 G/DL (ref 6.4–8.2)
RBC # BLD AUTO: 4.53 M/UL (ref 3.8–5.2)
SODIUM SERPL-SCNC: 139 MMOL/L (ref 136–145)
TRIGL SERPL-MCNC: 173 MG/DL (ref ?–150)
TSH SERPL DL<=0.05 MIU/L-ACNC: 0.02 UIU/ML (ref 0.36–3.74)
VLDLC SERPL CALC-MCNC: 34.6 MG/DL
WBC # BLD AUTO: 9.3 K/UL (ref 3.6–11)

## 2022-04-01 PROCEDURE — 90472 IMMUNIZATION ADMIN EACH ADD: CPT | Performed by: INTERNAL MEDICINE

## 2022-04-01 PROCEDURE — 99396 PREV VISIT EST AGE 40-64: CPT | Performed by: INTERNAL MEDICINE

## 2022-04-01 PROCEDURE — 90471 IMMUNIZATION ADMIN: CPT | Performed by: INTERNAL MEDICINE

## 2022-04-01 PROCEDURE — 93000 ELECTROCARDIOGRAM COMPLETE: CPT | Performed by: INTERNAL MEDICINE

## 2022-04-01 PROCEDURE — 90750 HZV VACC RECOMBINANT IM: CPT | Performed by: INTERNAL MEDICINE

## 2022-04-01 PROCEDURE — 90732 PPSV23 VACC 2 YRS+ SUBQ/IM: CPT | Performed by: INTERNAL MEDICINE

## 2022-04-01 NOTE — PROGRESS NOTES
1. \"Have you been to the ER, urgent care clinic since your last visit? Hospitalized since your last visit? \" No    2. \"Have you seen or consulted any other health care providers outside of the [address] [place] [address] since your last visit? \" No     3. For patients aged 39-70: Has the patient had a colonoscopy / FIT/ Cologuard? Yes, Next one due 2029      If the patient is female:    4. For patients aged 41-77: Has the patient had a mammogram within the past 2 years? No      5. For patients aged 21-65: Has the patient had a pap smear?   No

## 2022-04-01 NOTE — PATIENT INSTRUCTIONS
Office Policies    Phone calls/patient messages:            Please allow up to 24 hours for someone in the office to contact you about your call or message. Be mindful your provider may be out of the office or your message may require further review. We encourage you to use [employer] for your messages as this is a faster, more efficient way to communicate with our office                         Medication Refills:            Prescription medications require 48-72 business hours to process. We encourage you to use [employer] for your refills. For controlled medications: Please allow 72 business hours to process. Certain medications may require you to  a written prescription at our office. NO narcotic/controlled medications will be prescribed after 4pm Monday through Friday or on weekends              Form/Paperwork Completion:            Please note a $25 fee may incur for all paperwork for completed by our providers. We ask that you allow 7-10 business days. Pre-payment is due prior to picking up/faxing the completed form. You may also download your forms to [employer] to have your doctor print off.

## 2022-04-01 NOTE — PROGRESS NOTES
HISTORY OF Cj Gomes is a 46 y.o. female. HPI    F/u depression, hypothroidism, anemia--s/p total- hyst and b/l salpingectomy--[facility]--Dr Liu, HTN smoker and CPE  Depression symptoms on cymbalta-some lack of motivation. 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  Has chronic diarrhea--had dnxemceopi-idcpqlsgm-fztwal polyp 2019     C/opain in knees ,hands and feetwith righ tknee effusion  C/o feeling tired but not cold    Patient Active Problem List    Diagnosis Date Noted    Essential hypertension 12/15/2020    Iron deficiency anemia due to chronic blood loss 02/16/2017    Hypothyroidism 02/11/2013    Smoker 02/11/2013    Depression 02/11/2013    Anxiety 02/11/2013    AK (actinic keratosis) 02/11/2013     Current Outpatient Medications   Medication Sig Dispense Refill    losartan (COZAAR) 50 mg tablet TAKE 1 TABLET DAILY 90 Tablet 1    hydroCHLOROthiazide (HYDRODIURIL) 12.5 mg tablet TAKE 1 TABLET DAILY 90 Tablet 1    mupirocin (BACTROBAN) 2 % ointment Apply  to affected area daily. 22 g 0    Synthroid 112 mcg tablet Take 1 Tablet by mouth Daily (before breakfast). 90 Tablet 3    DULoxetine (CYMBALTA) 60 mg capsule Take 1 Capsule by mouth daily. 90 Capsule 1    omeprazole (PRILOSEC) 20 mg capsule Take 20 mg by mouth daily. OTC      cyanocobalamin, vitamin B-12, (VITAMIN B12 PO) Take  by mouth.       glucosam-chond-hyalu-CF borate (MOVE FREE JOINT HEALTH) 750 mg-100 mg- 1.65 mg-108 mg tab Take  by mouth.  zolpidem (AMBIEN) 5 mg tablet Take 1 Tab by mouth nightly as needed for Sleep. Max Daily Amount: 5 mg. Indications: SLEEP-ONSET INSOMNIA 30 Tab 2    ibuprofen (ADVIL) 200 mg tablet Take 400 mg by mouth every six (6) hours as needed for Pain. No Known Allergies  Social History     Tobacco Use    Smoking status: Current Every Day Smoker     Packs/day: 1.00     Years: 30.00     Pack years: 30.00    Smokeless tobacco: Never Used   Substance Use Topics    Alcohol use: Not Currently      Lab Results   Component Value Date/Time    WBC 7.7 08/06/2018 09:33 AM    HGB 15.0 08/06/2018 09:33 AM    Hemoglobin (POC) 9.1 (A) 05/18/2017 11:02 AM    HCT 46.8 (H) 08/06/2018 09:33 AM    PLATELET 223 13/53/1112 09:33 AM     (H) 08/06/2018 09:33 AM     Lab Results   Component Value Date/Time    Glucose 89 02/25/2019 02:31 PM    LDL, calculated 99 02/25/2019 02:31 PM    Creatinine 0.48 (L) 02/25/2019 02:31 PM      Lab Results   Component Value Date/Time    Cholesterol, total 174 02/25/2019 02:31 PM    HDL Cholesterol 49 02/25/2019 02:31 PM    LDL, calculated 99 02/25/2019 02:31 PM    Triglyceride 131 02/25/2019 02:31 PM     Lab Results   Component Value Date/Time    GFR est non- 02/25/2019 02:31 PM    GFR est  02/25/2019 02:31 PM    Creatinine 0.48 (L) 02/25/2019 02:31 PM    BUN 9 02/25/2019 02:31 PM    Sodium 140 02/25/2019 02:31 PM    Potassium 4.3 02/25/2019 02:31 PM    Chloride 102 02/25/2019 02:31 PM    CO2 24 02/25/2019 02:31 PM     Lab Results   Component Value Date/Time    TSH 0.614 02/25/2019 02:31 PM    T4, Free 1.48 02/25/2019 02:31 PM      Lab Results   Component Value Date/Time    Glucose 89 02/25/2019 02:31 PM         Review of Systems   Constitutional: Negative for chills, fever, malaise/fatigue and weight loss. HENT: Negative. Eyes: Negative for blurred vision and double vision.    Respiratory: Negative for cough and shortness of breath. Cardiovascular: Negative for chest pain and palpitations. Gastrointestinal: Positive for constipation. Negative for abdominal pain, blood in stool, diarrhea, melena, nausea and vomiting. Genitourinary: Negative for dysuria, frequency, hematuria and urgency. Musculoskeletal: Positive for back pain and joint pain. Negative for falls and myalgias. Skin: Negative. Neurological: Negative for dizziness, tremors and headaches. Psychiatric/Behavioral: Positive for depression. Physical Exam  Vitals and nursing note reviewed. Constitutional:       Appearance: Normal appearance. She is well-developed. Comments: Appears stated age   Cardiovascular:      Rate and Rhythm: Normal rate and regular rhythm. Heart sounds: Normal heart sounds. No murmur heard. No friction rub. No gallop. Pulmonary:      Effort: Pulmonary effort is normal. No respiratory distress. Breath sounds: Normal breath sounds. No wheezing. Abdominal:      General: Bowel sounds are normal.      Palpations: Abdomen is soft. Neurological:      Mental Status: She is alert. ASSESSMENT and PLAN  Diagnoses and all orders for this visit:    1. Routine general medical examination at a health care facility  -     [facility] MAMMO BI SCREENING INCL CAD; Future  -     AMB POC EKG ROUTINE W/ 12 LEADS, INTER & REP  -     HEPATITIS C AB; Future  -     CBC W/O DIFF; Future  -     METABOLIC PANEL, COMPREHENSIVE; Future  -     TSH 3RD GENERATION; Future  -     LIPID PANEL; Future   shignrix and pneumovax 23 today    2. Hypertension, unspecified type   controlled  3. Palpitation  -     AMB POC EKG ROUTINE W/ 12 LEADS, INTER & REP   Refer Cardiolgy   Fu labs  4.  Encounter for immunization  -     PNEUMOCOCCAL POLYSACCHARIDE VACCINE, 23-VALENT, ADULT OR IMMUNOSUPPRESSED PT DOSE,  -     ZOSTER VACC RECOMBINANT ADJUVANTED    5. Personal history of tobacco use, presenting hazards to health  -     CT LOW DOSE LUNG CANCER SCREENING; Future   Counseled on cessation    6. Depression   Continue cymbalta, counseling prn  7. Hx skin cancer   Reina Stuart MD  8. Hypothyroid   Fu TSH      Discussed with the patient the current USPSTF guidelines released March 9, 2021 for screening for lung cancer. For adults aged 48 to [de-identified] years who have a 20 pack-year smoking history and currently smoke or have quit within the past 15 years the grade B recommendation is to:  Screen for lung cancer with low-dose computed tomography (LDCT) every year. Stop screening once a person has not smoked for 15 years or has a health problem that limits life expectancy or the ability to have lung surgery. The patient has a 35 pack-year history of cigarette smoking and currently is 1ppd. Discussed with patient the risks and benefits of screening, including over-diagnosis, false positive rate, and total radiation exposure. The patient currently exhibits no signs or symptoms suggestive of lung cancer. Discussed with patient the importance of compliance with yearly annual lung cancer screenings and willingness to undergo diagnosis and treatment if screening scan is positive. In addition, the patient was counseled regarding the importance of remaining smoke free and/or total smoking cessation.     Also reviewed the following if the patient has Medicare that as of February 10, 2022, Medicare only covers LDCT screening in patients aged 51-72 with at least a 20 pack-year smoking history who currently smoke or have quit in the last 15 years

## 2022-04-04 LAB — T4 FREE SERPL-MCNC: 1.6 NG/DL (ref 0.8–1.5)

## 2022-04-07 RX ORDER — LEVOTHYROXINE SODIUM 100 UG/1
100 TABLET ORAL
Qty: 90 TABLET | Refills: 0 | Status: SHIPPED | OUTPATIENT
Start: 2022-04-07 | End: 2022-07-18 | Stop reason: SDUPTHER

## 2022-04-07 NOTE — PROGRESS NOTES
Tell pt hep C is negative, malissa; cbc glucose lytes liver.   Mild cholesterol elevation-low fat low cholesterol diet recommended for now  Thyroid level is too high---lower dose of levothyroxine to 100 mcg every day and repeat TSH in 6-8 weeeks

## 2022-04-16 DIAGNOSIS — F51.01 PRIMARY INSOMNIA: ICD-10-CM

## 2022-04-16 RX ORDER — ZOLPIDEM TARTRATE 5 MG/1
TABLET ORAL
Qty: 30 TABLET | Refills: 5 | Status: SHIPPED | OUTPATIENT
Start: 2022-04-16 | End: 2022-04-18 | Stop reason: SDUPTHER

## 2022-04-18 ENCOUNTER — PATIENT MESSAGE (OUTPATIENT)
Dept: INTERNAL MEDICINE CLINIC | Age: 52
End: 2022-04-18

## 2022-04-18 DIAGNOSIS — F51.01 PRIMARY INSOMNIA: ICD-10-CM

## 2022-04-18 RX ORDER — ZOLPIDEM TARTRATE 5 MG/1
TABLET ORAL
Qty: 30 TABLET | Refills: 5 | Status: SHIPPED | OUTPATIENT
Start: 2022-04-18 | End: 2022-08-16 | Stop reason: SDUPTHER

## 2022-04-18 NOTE — TELEPHONE ENCOUNTER
PCP: Jose Daniel Araujo MD    Last appt: 4/1/2022  Future Appointments   Date Time Provider Venkata Rene   10/11/2022  8:45 AM Jose Daniel Araujo MD [facility] BS AMB       Requested Prescriptions     Pending Prescriptions Disp Refills    zolpidem (AMBIEN) 5 mg tablet 30 Tablet 5     Sig: TAKE 1 TABLET BY MOUTH AT BEDTIME AS NEEDED FOR SLEEP       Prior labs and Blood pressures:  BP Readings from Last 3 Encounters:   04/01/22 110/70   08/12/19 128/68   08/01/19 124/76     Lab Results   Component Value Date/Time    Sodium 139 04/01/2022 10:27 AM    Potassium 4.8 04/01/2022 10:27 AM    Chloride 104 04/01/2022 10:27 AM    CO2 29 04/01/2022 10:27 AM    Anion gap 6 04/01/2022 10:27 AM    Glucose 99 04/01/2022 10:27 AM    BUN 13 04/01/2022 10:27 AM    Creatinine 0.59 04/01/2022 10:27 AM    BUN/Creatinine ratio 22 (H) 04/01/2022 10:27 AM    GFR est AA >60 04/01/2022 10:27 AM    GFR est non-AA >60 04/01/2022 10:27 AM    Calcium 9.7 04/01/2022 10:27 AM     No results found for: HBA1C, VMI6HEHL, LUY4LSVE, WBX1MOHV  Lab Results   Component Value Date/Time    Cholesterol, total 187 04/01/2022 10:27 AM    HDL Cholesterol 42 04/01/2022 10:27 AM    LDL, calculated 110.4 (H) 04/01/2022 10:27 AM    VLDL, calculated 34.6 04/01/2022 10:27 AM    Triglyceride 173 (H) 04/01/2022 10:27 AM    CHOL/HDL Ratio 4.5 04/01/2022 10:27 AM     No results found for: VITD3, XQVID2, XQVID3, XQVID, VD3RIA    Lab Results   Component Value Date/Time    TSH 0.02 (L) 04/01/2022 10:27 AM

## 2022-07-18 RX ORDER — LEVOTHYROXINE SODIUM 100 UG/1
100 TABLET ORAL
Qty: 90 TABLET | Refills: 0 | Status: SHIPPED | OUTPATIENT
Start: 2022-07-18 | End: 2022-10-20

## 2022-07-26 RX ORDER — DULOXETIN HYDROCHLORIDE 60 MG/1
60 CAPSULE, DELAYED RELEASE ORAL DAILY
Qty: 90 CAPSULE | Refills: 1 | Status: SHIPPED | OUTPATIENT
Start: 2022-07-26

## 2022-07-26 NOTE — TELEPHONE ENCOUNTER
Future Appointments:  Future Appointments   Date Time Provider Venkata Rene   10/11/2022  8:45 AM Nestor Arias MD [facility] BS AMB        Last Appointment With Me:  4/1/2022     Requested Prescriptions     Pending Prescriptions Disp Refills    DULoxetine (CYMBALTA) 60 mg capsule 90 Capsule 1     Sig: Take 1 Capsule by mouth in the morning.

## 2022-08-16 DIAGNOSIS — F51.01 PRIMARY INSOMNIA: ICD-10-CM

## 2022-08-16 RX ORDER — MUPIROCIN 20 MG/G
OINTMENT TOPICAL DAILY
Qty: 22 G | Refills: 0 | Status: SHIPPED | OUTPATIENT
Start: 2022-08-16

## 2022-08-16 RX ORDER — ZOLPIDEM TARTRATE 5 MG/1
TABLET ORAL
Qty: 30 TABLET | Refills: 5 | Status: SHIPPED | OUTPATIENT
Start: 2022-08-16

## 2022-08-16 NOTE — TELEPHONE ENCOUNTER
PCP: Jesus Dixon MD    Last appt: 4/1/2022  Future Appointments   Date Time Provider Venkata Rene   10/11/2022  8:45 AM Jesus Dixon MD [facility] BS AMB       Requested Prescriptions     Pending Prescriptions Disp Refills    zolpidem (AMBIEN) 5 mg tablet 30 Tablet 5     Sig: TAKE 1 TABLET BY MOUTH AT BEDTIME AS NEEDED FOR SLEEP       Prior labs and Blood pressures:  BP Readings from Last 3 Encounters:   04/01/22 110/70   08/12/19 128/68   08/01/19 124/76     Lab Results   Component Value Date/Time    Sodium 139 04/01/2022 10:27 AM    Potassium 4.8 04/01/2022 10:27 AM    Chloride 104 04/01/2022 10:27 AM    CO2 29 04/01/2022 10:27 AM    Anion gap 6 04/01/2022 10:27 AM    Glucose 99 04/01/2022 10:27 AM    BUN 13 04/01/2022 10:27 AM    Creatinine 0.59 04/01/2022 10:27 AM    BUN/Creatinine ratio 22 (H) 04/01/2022 10:27 AM    GFR est AA >60 04/01/2022 10:27 AM    GFR est non-AA >60 04/01/2022 10:27 AM    Calcium 9.7 04/01/2022 10:27 AM     No results found for: HBA1C, KTA8BUND, ETA2EBBD  Lab Results   Component Value Date/Time    Cholesterol, total 187 04/01/2022 10:27 AM    HDL Cholesterol 42 04/01/2022 10:27 AM    LDL, calculated 110.4 (H) 04/01/2022 10:27 AM    VLDL, calculated 34.6 04/01/2022 10:27 AM    Triglyceride 173 (H) 04/01/2022 10:27 AM    CHOL/HDL Ratio 4.5 04/01/2022 10:27 AM     No results found for: VITD3, XQVID2, XQVID3, XQVID, VD3RIA    Lab Results   Component Value Date/Time    TSH 0.02 (L) 04/01/2022 10:27 AM

## 2022-09-21 DIAGNOSIS — I10 ESSENTIAL HYPERTENSION: ICD-10-CM

## 2022-09-21 RX ORDER — HYDROCHLOROTHIAZIDE 12.5 MG/1
TABLET ORAL
Qty: 90 TABLET | Refills: 1 | Status: SHIPPED | OUTPATIENT
Start: 2022-09-21

## 2022-09-21 RX ORDER — LOSARTAN POTASSIUM 50 MG/1
TABLET ORAL
Qty: 90 TABLET | Refills: 1 | Status: SHIPPED | OUTPATIENT
Start: 2022-09-21

## 2022-09-21 NOTE — TELEPHONE ENCOUNTER
Future Appointments:  Future Appointments   Date Time Provider Venkata Forbesi   10/11/2022  8:45 AM Peyton Bermudez MD [facility] BS AMB        Last Appointment With Me:  4/1/2022     Requested Prescriptions     Pending Prescriptions Disp Refills    losartan (COZAAR) 50 mg tablet 90 Tablet 1     Sig: TAKE 1 TABLET DAILY    hydroCHLOROthiazide (HYDRODIURIL) 12.5 mg tablet 90 Tablet 1     Sig: TAKE 1 TABLET DAILY

## 2023-01-05 RX ORDER — LEVOTHYROXINE SODIUM 100 UG/1
TABLET ORAL
Qty: 90 TABLET | Refills: 0 | Status: SHIPPED | OUTPATIENT
Start: 2023-01-05

## 2023-01-24 RX ORDER — DULOXETIN HYDROCHLORIDE 60 MG/1
60 CAPSULE, DELAYED RELEASE ORAL DAILY
Qty: 90 CAPSULE | Refills: 1 | Status: SHIPPED | OUTPATIENT
Start: 2023-01-24

## 2023-01-24 NOTE — TELEPHONE ENCOUNTER
Future Appointments:  No future appointments. Last Appointment With Me:  04/01/2022    Requested Prescriptions     Pending Prescriptions Disp Refills    DULoxetine (CYMBALTA) 60 mg capsule 90 Capsule 1     Sig: Take 1 Capsule by mouth daily.

## 2023-03-20 DIAGNOSIS — I10 ESSENTIAL HYPERTENSION: ICD-10-CM

## 2023-03-20 RX ORDER — HYDROCHLOROTHIAZIDE 12.5 MG/1
TABLET ORAL
Qty: 90 TABLET | Refills: 1 | Status: SHIPPED | OUTPATIENT
Start: 2023-03-20

## 2023-03-20 RX ORDER — LOSARTAN POTASSIUM 50 MG/1
TABLET ORAL
Qty: 90 TABLET | Refills: 1 | Status: SHIPPED | OUTPATIENT
Start: 2023-03-20

## 2023-03-27 RX ORDER — LEVOTHYROXINE SODIUM 100 UG/1
100 TABLET ORAL
Qty: 90 TABLET | Refills: 3 | Status: SHIPPED | OUTPATIENT
Start: 2023-03-27

## 2023-03-27 NOTE — TELEPHONE ENCOUNTER
PCP: Orion Ag MD    Last appt: 4/1/2022  No future appointments. Requested Prescriptions     Pending Prescriptions Disp Refills    levothyroxine (SYNTHROID) 100 mcg tablet 90 Tablet 3     Sig: Take 1 Tablet by mouth Daily (before breakfast).

## 2023-05-23 ENCOUNTER — PATIENT MESSAGE (OUTPATIENT)
Age: 53
End: 2023-05-23

## 2023-05-23 DIAGNOSIS — G47.00 INSOMNIA, UNSPECIFIED TYPE: Primary | ICD-10-CM

## 2023-05-23 RX ORDER — ZOLPIDEM TARTRATE 5 MG/1
5 TABLET ORAL NIGHTLY PRN
Qty: 30 TABLET | Refills: 2 | Status: SHIPPED | OUTPATIENT
Start: 2023-05-23 | End: 2023-08-21

## 2023-05-23 RX ORDER — ZOLPIDEM TARTRATE 5 MG/1
1 TABLET ORAL NIGHTLY PRN
COMMUNITY
Start: 2022-08-16 | End: 2023-05-23 | Stop reason: SDUPTHER

## 2023-05-23 NOTE — TELEPHONE ENCOUNTER
PCP: Yoselin Diaz MD    Last appt: 4/1/2022  No future appointments. Requested Prescriptions     Pending Prescriptions Disp Refills    zolpidem (AMBIEN) 5 MG tablet 30 tablet 2     Sig: Take 1 tablet by mouth nightly as needed for Sleep for up to 90 days.  Max Daily Amount: 5 mg

## 2023-05-23 NOTE — TELEPHONE ENCOUNTER
From: [place]  To: Dr. Singh: 5/23/2023 2:36 PM EDT  Subject: Zolpidem Refill    Could you please refill my Zolpidem Tartrate 5 Mg Tablet?  Please send to: [email or web address] [address] [place] [address] - [[phone]]

## 2023-05-24 RX ORDER — IBUPROFEN 200 MG
400 TABLET ORAL EVERY 6 HOURS PRN
COMMUNITY

## 2023-05-24 RX ORDER — LEVOTHYROXINE SODIUM 0.1 MG/1
100 TABLET ORAL
COMMUNITY
Start: 2023-03-27

## 2023-05-24 RX ORDER — HYDROCHLOROTHIAZIDE 12.5 MG/1
1 TABLET ORAL DAILY
COMMUNITY
Start: 2023-03-20

## 2023-05-24 RX ORDER — LOSARTAN POTASSIUM 50 MG/1
1 TABLET ORAL DAILY
COMMUNITY
Start: 2023-03-20

## 2023-05-24 RX ORDER — DULOXETIN HYDROCHLORIDE 60 MG/1
60 CAPSULE, DELAYED RELEASE ORAL DAILY
COMMUNITY
Start: 2023-01-24

## (undated) DEVICE — STERILE POLYISOPRENE POWDER-FREE SURGICAL GLOVES WITH EMOLLIENT COATING: Brand: PROTEXIS

## (undated) DEVICE — PREP PAD BNS: Brand: CONVERTORS

## (undated) DEVICE — TOWEL SURG W17XL27IN STD BLU COT NONFENESTRATED PREWASHED

## (undated) DEVICE — PAD 05IN BASE 3IN PEAK M DENS CONVOLUTED FOAM

## (undated) DEVICE — SCISSORS SURG DIA8MM MPLR CRV ENDOWRIST

## (undated) DEVICE — NEEDLE SPNL 22GA L3.5IN BLK HUB S STL REG WALL FIT STYL W/

## (undated) DEVICE — NEEDLE INSUF L120MM DIA2MM DISP FOR PNEUMOPERI ENDOPATH

## (undated) DEVICE — SEAL UNIV 5-8MM DISP BX/10 -- DA VINCI XI - SNGL USE

## (undated) DEVICE — LIGHT HANDLE: Brand: DEVON

## (undated) DEVICE — INFECTION CONTROL KIT SYS

## (undated) DEVICE — SUTURE STRATAFIX SPRL PDS + SZ 2-0 L6IN ABSRB VLT L36MM SXPP1B409

## (undated) DEVICE — REM POLYHESIVE ADULT PATIENT RETURN ELECTRODE: Brand: VALLEYLAB

## (undated) DEVICE — (D)PREP SKN CHLRAPRP APPL 26ML -- CONVERT TO ITEM 371833

## (undated) DEVICE — CATH FOL TY IC BAG 16FR 2000ML -- CONVERT TO ITEM 363158

## (undated) DEVICE — TUBING IRRIG L96IN DIA0.241IN L BOR T-U-R W/ NVENT PIERCING

## (undated) DEVICE — KENDALL SCD EXPRESS SLEEVES, KNEE LENGTH, MEDIUM: Brand: KENDALL SCD

## (undated) DEVICE — PAD SANIT NPKN 4IN GRD

## (undated) DEVICE — OBTRTR BLDELSS 8MM DISP -- DA VINCI - SNGL USE

## (undated) DEVICE — FENESTRATED BIPOLAR FORCEPS: Brand: ENDOWRIST

## (undated) DEVICE — SUTURE MCRYL SZ 4-0 L27IN ABSRB UD L19MM PS-2 1/2 CIR PRIM Y426H

## (undated) DEVICE — MEGA NEEDLE DRIVER: Brand: ENDOWRIST

## (undated) DEVICE — PLUMEPORT LAPAROSCOPIC SMOKE FILTRATION DEVICE: Brand: PLUMEPORT ACTIV

## (undated) DEVICE — NEEDLE HYPO 22GA L1.5IN BLK POLYPR HUB S STL REG BVL STR

## (undated) DEVICE — MEDI-VAC NON-CONDUCTIVE SUCTION TUBING: Brand: CARDINAL HEALTH

## (undated) DEVICE — VCARE MEDIUM, UTERINE MANIPULATOR, VAGINAL-CERVICAL-AHLUWALIA'S-RETRACTOR-ELEVATOR: Brand: VCARE

## (undated) DEVICE — STERILE POLYISOPRENE POWDER-FREE SURGICAL GLOVES: Brand: PROTEXIS

## (undated) DEVICE — (D)SYR 10ML 1/5ML GRAD NSAF -- PKGING CHANGE USE ITEM 338027

## (undated) DEVICE — SURGICAL PROCEDURE PACK GYN LAPAROSCOPY CUST SMH LF

## (undated) DEVICE — DRAPE,REIN 53X77,STERILE: Brand: MEDLINE

## (undated) DEVICE — 1200 GUARD II KIT W/5MM TUBE W/O VAC TUBE: Brand: GUARDIAN

## (undated) DEVICE — DEVON™ KNEE AND BODY STRAP 60" X 3" (1.5 M X 7.6 CM): Brand: DEVON

## (undated) DEVICE — Device

## (undated) DEVICE — SOLUTION IV 1000ML 0.9% SOD CHL

## (undated) DEVICE — ARM DRAPE

## (undated) DEVICE — TUBING INSUF HEAT STRL 10 FT --

## (undated) DEVICE — GOWN,SIRUS,FABRNF,XL,20/CS: Brand: MEDLINE

## (undated) DEVICE — ELECTRO LUBE IS A SINGLE PATIENT USE DEVICE THAT IS INTENDED TO BE USED ON ELECTROSURGICAL ELECTRODES TO REDUCE STICKING.: Brand: KEY SURGICAL ELECTRO LUBE

## (undated) DEVICE — TIP COVER ACCESSORY

## (undated) DEVICE — DRAPE SURG EQUIP W105XH13XL20IN 3 ARM DISPOSABLE DA VINCI S

## (undated) DEVICE — VISUALIZATION SYSTEM: Brand: CLEARIFY

## (undated) DEVICE — DERMABOND SKIN ADH 0.7ML -- DERMABOND ADVANCED 12/BX

## (undated) DEVICE — HANDLE LT SNAP ON ULT DURABLE LENS FOR TRUMPF ALC DISPOSABLE

## (undated) DEVICE — BLADELESS OBTURATOR

## (undated) DEVICE — 4-PORT MANIFOLD: Brand: NEPTUNE 2

## (undated) DEVICE — TRAY PREP DRY W/ PREM GLV 2 APPL 6 SPNG 2 UNDPD 1 OVERWRAP

## (undated) DEVICE — D&C/GYN-LF: Brand: MEDLINE INDUSTRIES, INC.

## (undated) DEVICE — SOLUTION IRRIG 3000ML 0.9% SOD CHL FLX CONT 0797208] ICU MEDICAL INC]